# Patient Record
Sex: MALE | Race: WHITE | NOT HISPANIC OR LATINO | Employment: PART TIME | URBAN - METROPOLITAN AREA
[De-identification: names, ages, dates, MRNs, and addresses within clinical notes are randomized per-mention and may not be internally consistent; named-entity substitution may affect disease eponyms.]

---

## 2017-04-06 PROBLEM — Z86.010 HISTORY OF COLON POLYPS: Status: ACTIVE | Noted: 2017-04-06

## 2017-04-06 PROBLEM — R10.84 GENERALIZED ABDOMINAL PAIN: Status: ACTIVE | Noted: 2017-04-06

## 2017-04-06 PROBLEM — Z86.0100 HISTORY OF COLON POLYPS: Status: ACTIVE | Noted: 2017-04-06

## 2017-04-06 PROBLEM — R63.4 WEIGHT LOSS: Status: ACTIVE | Noted: 2017-04-06

## 2017-04-12 PROBLEM — N17.9 AKI (ACUTE KIDNEY INJURY): Status: ACTIVE | Noted: 2017-04-12

## 2017-04-12 PROBLEM — I48.92 ATRIAL FLUTTER: Status: ACTIVE | Noted: 2017-04-12

## 2017-05-31 PROBLEM — I48.92 ATRIAL FLUTTER WITH RAPID VENTRICULAR RESPONSE: Status: ACTIVE | Noted: 2017-05-31

## 2017-05-31 PROBLEM — I48.91 ATRIAL FIBRILLATION: Status: ACTIVE | Noted: 2017-04-12

## 2017-06-05 ENCOUNTER — PATIENT OUTREACH (OUTPATIENT)
Dept: ADMINISTRATIVE | Facility: CLINIC | Age: 72
End: 2017-06-05
Payer: MEDICARE

## 2017-06-05 ENCOUNTER — NURSE TRIAGE (OUTPATIENT)
Dept: ADMINISTRATIVE | Facility: CLINIC | Age: 72
End: 2017-06-05

## 2017-06-05 NOTE — PATIENT INSTRUCTIONS
Discharge Instructions for Atrial Fibrillation  You have been diagnosed with an abnormal heart rhythm called atrial fibrillation. With this condition, your hearts 2 upper chambers quiver rather than squeeze the blood out in a normal pattern. This leads to an irregular and sometimes rapid heartbeat. Some people will develop associated symptoms such as a flip-flopping heartbeat, chest pain, lightheadedness, or shortness of breath. Other people may have no symptoms at all. Atrial fibrillation is serious because it affects the hearts ability to fill with blood as it should. Blood clots may form. This increases the risk for stroke. Untreated atrial fibrillation can also lead to heart failure. Atrial fibrillation can be controlled. With treatment, most people with atrial fibrillation lead normal lives.  Treatment options  Recommended treatment for atrial fibrillation depends on your age, symptoms, how long you have had atrial fibrillation, and other factors. You will have a complete evaluation to find out if you have any abnormalities that caused your heart to go into atrial fibrillation. This might be blocked heart arteries or a thyroid problem. Your doctor will assess your particular case and discuss choices with you.  Treatment choices may include:  · Treating an underlying disorder that puts you at risk for atrial fibrillation. For example, correcting an abnormal thyroid or electrolyte problem, or treating a blocked heart artery.  · Restoring a normal heart rhythm with an electrical shock (cardioversion) or with an antiarrhythmic medicine (chemical cardioversion)  · Using medicine to control your heart rate in atrial fibrillation.  · Preventing the risk for blood clot and stroke using blood-thinning medicines. Your doctor will tell you what he or she recommends. Choices may include aspirin, clopidogrel, warfarin, dabigatran, rivaroxaban, apixaban, and edoxaban.  · Doing catheter ablation or a surgical maze  procedure. These use different methods to destroy certain areas of heart tissue. This interrupts the electrical signals causing atrial fibrillation. One of these procedures may be a choice when medicines do not work, or as an alternative to long-term medicine.  · Other treatment choices may be recommended for you by your doctor.  Managing risk factors for stroke and preventing heart failure are important parts of any treatment plan for atrial fibrillation.  Home care  · Take your medicines exactly as directed. Dont skip doses.  · Work with your doctor to find the right medicines and doses for you.  · Learn to take your own pulse. Keep a record of your results. Ask your doctor which pulse rates mean that you need medical attention. Slowing your pulse is often the goal of treatment. Ask your doctor if its OK for you to use an automatic machine to check your pulse at home. Sometimes these machines dont count the pulse correctly when you have atrial fibrillation.  · Limit your intake of coffee, tea, cola, and other beverages with caffeine. Talk with your doctor about whether you should eliminate caffeine.  · Avoid over-the-counter medicines that have caffeine in them.  · Let your doctor know what medicines you take, including prescription and over-the-counter medicines, as well as any supplements. They interfere with some medicines given for atrial fibrillation.  · Ask your doctor about whether you can drink alcohol. Some people need to avoid alcohol to better treat atrial fibrillation. If you are taking blood-thinner medicines, alcohol may interfere with them by increasing their effect.  · Never take stimulants such as amphetamines or cocaine. These drugs can speed up your heart rate and trigger atrial fibrillation.  Follow-up care  Follow up with your doctor, or as advised.     When should I call my healthcare provider  Call your healthcare provider right away if you have any of the  following:  · Weakness  · Dizziness  · Fainting  · Fatigue  · Shortness of breath  · Chest pain with increased activity  · A change in the usual regularity of your heartbeat, or an unusually fast heartbeat   Date Last Reviewed: 4/23/2016  © 2596-4233 [employer] [place] [address], [place] [address]. All rights reserved. This information is not intended as a substitute for professional medical care. Always follow your healthcare professional's instructions.

## 2017-06-05 NOTE — PROGRESS NOTES
C3 nurse attempted to contact patient. The following occurred:   C3 nurse attempted to contact Tacho Guerrier  for a TCC post hospital discharge follow up call. The patient is unable to conduct the call @ this time. The patient requested a callback.    The patient does not have a scheduled HOSFU appointment within 7-14 days post hospital discharge date 06/01/17. Message sent to  to assist with HOSFU appointment scheduling.

## 2017-06-05 NOTE — TELEPHONE ENCOUNTER
Reason for Disposition   [1] Follow-up call to recent contact AND [2] information only call, no triage required    Protocols used: ST INFORMATION ONLY CALL-A-    Pt returning call to call center. RN notified. Call back with questions.

## 2017-07-17 PROBLEM — I48.92 ATRIAL FLUTTER WITH RAPID VENTRICULAR RESPONSE: Status: RESOLVED | Noted: 2017-05-31 | Resolved: 2017-07-17

## 2017-07-17 PROBLEM — I48.0 PAF (PAROXYSMAL ATRIAL FIBRILLATION): Status: ACTIVE | Noted: 2017-04-12

## 2017-07-17 PROBLEM — I48.3 TYPICAL ATRIAL FLUTTER: Status: ACTIVE | Noted: 2017-04-12

## 2017-09-22 ENCOUNTER — OFFICE VISIT (OUTPATIENT)
Dept: NEUROLOGY | Facility: CLINIC | Age: 72
End: 2017-09-22
Payer: MEDICARE

## 2017-09-22 VITALS
WEIGHT: 142 LBS | DIASTOLIC BLOOD PRESSURE: 68 MMHG | RESPIRATION RATE: 18 BRPM | HEIGHT: 72 IN | BODY MASS INDEX: 19.23 KG/M2 | HEART RATE: 68 BPM | SYSTOLIC BLOOD PRESSURE: 108 MMHG

## 2017-09-22 DIAGNOSIS — G25.0 ESSENTIAL TREMOR: Primary | ICD-10-CM

## 2017-09-22 PROCEDURE — 99999 PR PBB SHADOW E&M-EST. PATIENT-LVL III: CPT | Mod: PBBFAC,,, | Performed by: PSYCHIATRY & NEUROLOGY

## 2017-09-22 PROCEDURE — 99204 OFFICE O/P NEW MOD 45 MIN: CPT | Mod: S$PBB | Performed by: PSYCHIATRY & NEUROLOGY

## 2017-09-22 PROCEDURE — 99213 OFFICE O/P EST LOW 20 MIN: CPT | Mod: PBBFAC | Performed by: PSYCHIATRY & NEUROLOGY

## 2017-09-22 PROCEDURE — 99999 PR STA SHADOW: CPT | Mod: PBBFAC,,, | Performed by: PSYCHIATRY & NEUROLOGY

## 2017-09-22 RX ORDER — RITONAVIR 100 MG/1
100 TABLET, FILM COATED ORAL DAILY
COMMUNITY
Start: 2017-08-05 | End: 2018-09-24

## 2017-09-22 NOTE — PROGRESS NOTES
HPI: He is here for management of his knownTremor: This was diagnosed as essential tremor prior. Tremor primarily involves the bilateral hands.  Onset of symptoms was in the 1990s, mild then and worsening the past year or two. He started Welburtin about 3 years for depression. He has never been hospitalized for depression. This helps his mood and he feels his mood is good and he may not need as much medication currently for mood. . His grandfather had the same tremor which involved his head and his paternal aunt has the same tremor.  Symptoms are currently of moderate severity. Tremor exacerbated by activity and is not present at rest. He notes this with drinking, eating writing.  Tremor is alleviated by resting extremity. He does not drink alcohol.He denies voice change, rigidity, difficult with posture, difficulty with swallowing and balance problems.   Gabapentin was used for numbness in the feet with HIV meds. He is still takes gabapentin 600mg nightly   He has a history of HIV which was diagnosed in in the 1990s as well.   He was unable to tolerate propranolol and it was feared that Primidone would interfere with his HAART.   ROS      I have reviewed all of this patient's past medical and surgical histories as well as family and social histories and active allergies and medications as documented in the electronic medical record.        Exam:  Gen Appearance, well developed/nourished in no apparent distress  CV: 2+ distal pulses with no edema or swelling  Neuro:  MS: Awake, alert, oriented to place, person, time, situation. Sustains attention. Recent/remote memory intact, Language is full to spontaneous speech/repetition/naming/comprehension. Fund of Knowledge is full  CN: Optic discs are flat with normal vasculature, PERRL, Extraoccular movements and visual fields are full. Normal facial sensation and strength, Hearing symmetric, Tongue and Palate are midline and strong. Shoulder Shrug symmetric and  strong.  Motor: Normal bulk, tone, no abnormal movements at rest but there is no-no tremor of the head at times and moderate tremor of outstretched hands. 5/5 strength bilateral upper/lower extremities with 1+ reflexes and no clonus  Sensory: symmetric to light touch, pain, temp, and vibration Romberg negative  Cerebellar: Finger-nose,Heal-shin, Rapid alternating movements intact  Gait: Normal stance, no ataxia    Labs: TSh this year is normal. CMP normal 7/2017      Assessment/Plan: Tacho Guerrier Jr. is a 71 y.o. male with many years of benign essential tremor  I recommend:   1. Wellbutrin is likely no longer needed at current dose and may be worsening his tremor. Reduce Wellbutrin to 150mg (1 and 1/2 pills) daily for 3 weeks, then reduce to 100mg ( 1 pill for 3 weeks). If still having tremor and mood is no worse after 3 more weeks, can reduce Wellbutrin to 50mg daily 1/2 pill   Primidone  2. Note his HIV status currently with low or undetectable viral loads: Tremor unrelated and instead related to family history.   3. Gabapentin is used for his numbness related to HAART. He has not noticed that this helps tremor. He was unable to tolerate propranolol and it was feared that Primidone would interfere with his HAART. Continue current dose gabapentin for now- if lowering dose of Wellbutrin is not tolerated, will consider another medication trial at the next visit.   RTC 12 weeks.

## 2017-09-22 NOTE — PATIENT INSTRUCTIONS
Reduce Wellbutrin to 150mg (1 and 1/2 pills) daily for 3 weeks, then reduce to 100mg ( 1 pill for 3 weeks). If still having tremor and mood is no worse after 3 more weeks, can reduce Wellbutrin to 50mg daily 1/2 pill

## 2017-12-28 ENCOUNTER — OFFICE VISIT (OUTPATIENT)
Dept: NEUROLOGY | Facility: CLINIC | Age: 72
End: 2017-12-28
Payer: MEDICARE

## 2017-12-28 VITALS
HEART RATE: 68 BPM | WEIGHT: 141.75 LBS | RESPIRATION RATE: 14 BRPM | HEIGHT: 72 IN | SYSTOLIC BLOOD PRESSURE: 114 MMHG | BODY MASS INDEX: 19.2 KG/M2 | DIASTOLIC BLOOD PRESSURE: 60 MMHG

## 2017-12-28 DIAGNOSIS — G25.0 ESSENTIAL TREMOR: Primary | Chronic | ICD-10-CM

## 2017-12-28 DIAGNOSIS — B20 HIV (HUMAN IMMUNODEFICIENCY VIRUS INFECTION): Chronic | ICD-10-CM

## 2017-12-28 PROCEDURE — 99215 OFFICE O/P EST HI 40 MIN: CPT | Mod: PBBFAC | Performed by: NURSE PRACTITIONER

## 2017-12-28 PROCEDURE — 99214 OFFICE O/P EST MOD 30 MIN: CPT | Mod: S$PBB | Performed by: NURSE PRACTITIONER

## 2017-12-28 PROCEDURE — 99999 PR PBB SHADOW E&M-EST. PATIENT-LVL V: CPT | Mod: PBBFAC,,, | Performed by: NURSE PRACTITIONER

## 2017-12-28 PROCEDURE — 99999 PR STA SHADOW: CPT | Mod: PBBFAC,,, | Performed by: NURSE PRACTITIONER

## 2017-12-28 RX ORDER — PROPRANOLOL HYDROCHLORIDE 10 MG/1
10 TABLET ORAL 2 TIMES DAILY
Qty: 60 TABLET | Refills: 11 | Status: SHIPPED | OUTPATIENT
Start: 2017-12-28 | End: 2018-01-25

## 2017-12-28 NOTE — PROGRESS NOTES
HPI: Tacho Guerrier Jr. is a 72 y.o. male with many years of benign essential tremor. He has a history of HIV, diagnosed in the 1990's. He also has depression.     He presents today for a follow up visit. He was advised to wean his Wellbutrin at his last visit to see if this was contributing to his tremor. He experience much worsening of his depression with the first dose wean, and further worsening with the next dose wean, and had to return to taking his original dose to control his depression. He reports that he had essential tremor long before he started Wellbutrin four years ago, and that there was no worsening of his tremor upon initiation of Wellbutrin.     ROS      I have reviewed all of this patient's past medical and surgical histories as well as family and social histories and active allergies and medications as documented in the electronic medical record.    Exam:  Gen Appearance, well developed/nourished in no apparent distress  CV: 2+ distal pulses with no edema or swelling  Neuro:  MS: Awake, alert, oriented to place, person, time, situation. Sustains attention. Recent/remote memory intact, Language is full to spontaneous speech/repetition/naming/comprehension. Fund of Knowledge is full  CN: Optic discs are flat with normal vasculature, PERRL, Extraoccular movements and visual fields are full. Normal facial sensation and strength, Hearing symmetric, Tongue and Palate are midline and strong. Shoulder Shrug symmetric and strong.  Motor: Normal bulk, tone, no abnormal movements at rest but there is no-no tremor of the head at times and moderate tremor of outstretched hands. 5/5 strength bilateral upper/lower extremities with 1+ reflexes and no clonus  Sensory: symmetric to light touch, pain, temp, and vibration Romberg negative  Cerebellar: Finger-nose,Heal-shin, Rapid alternating movements intact  Gait: Normal stance, no ataxia    Labs: TSh this year is normal. CMP normal 7/2017    Assessment/Plan:Tacho COBURN  Chey Morales is a 72 y.o. male with many years of benign essential tremor.     I recommend:   1. Trial of low dose Propranolol 5 mg bid, then increase as tolerated to 10 mg bid slowly. He did attempt Propranolol in the past, but was unable to tolerate. Primidone has been avoided, given his HAART use. It is likely that agents, such as Topamax would also have an interaction with his HAART.   2. He was unable to wean Wellbutrin, due to worsening of his depression; however, historically, his tremor did not worsen upon initiation of Wellbutrin.   3. Note his HIV status currently with low or undetectable viral loads: Tremor unrelated and instead related to family history.   4. Gabapentin is used for his numbness related to HAART. He has not noticed that this helps tremor. He was unable to tolerate propranolol and it was feared that Primidone would interfere with his HAART. Continue current dose Gabapentin for now.     RTC 4 weeks.

## 2017-12-28 NOTE — PATIENT INSTRUCTIONS
To start Propranolol, take:  1/2 in the morning, 1/2 at night for 1 week, then   Increase to 1 tablet in the morning for 1 week, then   Increase to 1 tablet twice a day afterwards if tolerated.     If at any point, you cannot tolerate the dose increase, reduce back to prior dose.

## 2018-01-12 ENCOUNTER — NURSE TRIAGE (OUTPATIENT)
Dept: ADMINISTRATIVE | Facility: CLINIC | Age: 73
End: 2018-01-12

## 2018-01-12 NOTE — TELEPHONE ENCOUNTER
Reason for Disposition   [1] Symptoms of pill stuck in throat or esophagus (e.g., pain in throat or chest, FB sensation) AND [2] no relief after using CARE ADVICE    Protocols used: ST SWALLOWED FOREIGN BODY-A-AH    Pt states he feels like the pill or pills is still stuck in his throat despite care advice form previous call. Attempted to schedule apt, schedule blocked. Please call pt to schedule/advise.

## 2018-01-12 NOTE — TELEPHONE ENCOUNTER
Reason for Disposition   Pill stuck in esophagus  (all triage questions negative)    Protocols used: ST SWALLOWED FOREIGN BODY-A-AH    Pt states a pill I stuck in his throat. States it is starting to feel better, there was a burning pain that is now getting better. Care advice given.

## 2018-01-25 ENCOUNTER — OFFICE VISIT (OUTPATIENT)
Dept: NEUROLOGY | Facility: CLINIC | Age: 73
End: 2018-01-25
Payer: MEDICARE

## 2018-01-25 ENCOUNTER — TELEPHONE (OUTPATIENT)
Dept: NEUROLOGY | Facility: CLINIC | Age: 73
End: 2018-01-25

## 2018-01-25 VITALS
DIASTOLIC BLOOD PRESSURE: 66 MMHG | BODY MASS INDEX: 19.87 KG/M2 | SYSTOLIC BLOOD PRESSURE: 110 MMHG | WEIGHT: 146.69 LBS | HEIGHT: 72 IN | HEART RATE: 64 BPM | RESPIRATION RATE: 16 BRPM

## 2018-01-25 DIAGNOSIS — B20 HIV (HUMAN IMMUNODEFICIENCY VIRUS INFECTION): Chronic | ICD-10-CM

## 2018-01-25 DIAGNOSIS — G25.0 ESSENTIAL TREMOR: Primary | Chronic | ICD-10-CM

## 2018-01-25 PROCEDURE — 99999 PR PBB SHADOW E&M-EST. PATIENT-LVL V: CPT | Mod: PBBFAC,,, | Performed by: NURSE PRACTITIONER

## 2018-01-25 PROCEDURE — 99215 OFFICE O/P EST HI 40 MIN: CPT | Mod: PBBFAC | Performed by: NURSE PRACTITIONER

## 2018-01-25 PROCEDURE — 99999 PR STA SHADOW: CPT | Mod: PBBFAC,,, | Performed by: NURSE PRACTITIONER

## 2018-01-25 PROCEDURE — 99214 OFFICE O/P EST MOD 30 MIN: CPT | Mod: S$PBB | Performed by: NURSE PRACTITIONER

## 2018-01-25 NOTE — PROGRESS NOTES
HPI: Tacho Guerrier Jr. is a 72 y.o. male with many years of benign essential tremor. He has a history of HIV, diagnosed in the 1990's. He also has depression.     He presents today for a follow up visit. He was placed on Propranolol at his last visit for essential tremor, which was overall ineffective for his tremor. His tremor continues to be quite bothersome to him, and interferes with him writing.     Review of Systems   Constitutional: Negative for malaise/fatigue.   Eyes: Negative for double vision.   Cardiovascular: Negative for chest pain.   Skin: Negative for rash.   Neurological: Positive for tremors and sensory change. Negative for tingling, focal weakness and weakness.   Psychiatric/Behavioral: Positive for depression. Negative for memory loss.         I have reviewed all of this patient's past medical and surgical histories as well as family and social histories and active allergies and medications as documented in the electronic medical record.    Exam:  Gen Appearance, well developed/nourished in no apparent distress  CV: 2+ distal pulses with no edema or swelling  Neuro:  MS: Awake, alert, oriented to place, person, time, situation. Sustains attention. Recent/remote memory intact, Language is full to spontaneous speech/repetition/naming/comprehension. Fund of Knowledge is full  CN: Optic discs are flat with normal vasculature, PERRL, Extraoccular movements and visual fields are full. Normal facial sensation and strength, Hearing symmetric, Tongue and Palate are midline and strong. Shoulder Shrug symmetric and strong.  Motor: Normal bulk, tone, no abnormal movements at rest but there is no-no tremor of the head at times and moderate tremor of outstretched hands. 5/5 strength bilateral upper/lower extremities with 1+ reflexes and no clonus  Sensory: symmetric to light touch, pain, temp, and vibration Romberg negative  Cerebellar: Finger-nose,Heal-shin, Rapid alternating movements intact  Gait: Normal  stance, no ataxia    Labs: TSH this year is normal. CMP normal 7/2017    Assessment/Plan:Tacho Guerrier Jr. is a 72 y.o. male with many years of benign essential tremor.     I recommend:   1. Wean Propranolol, as this was ineffective for his ET. Gabapentin, which he takes for neuropathic complaints, experienced after starting HAART, has had no effect on his tremors, which also failed to respond to weaning Wellbutrin, which only resulted in worsening of his depression. His tremor predated his Wellbutrin use.   2. Primidone has been avoided, given his HAART use. It is likely that other agents, such as Topamax would also have an interaction with his HAART.   3. Refer to movement disorders, Dr. Martinez, for consideration of options for his ET, given his HAART therapy and limitations. He may be a candidate for DBS, given his limited medication options.   3. Note his HIV status currently with low or undetectable viral loads: Tremor unrelated and instead related to family history.      FU 6 months.

## 2018-01-25 NOTE — TELEPHONE ENCOUNTER
Spoke with LAW Clayton, in Dr. Dejesus's office to discuss a NP appt with Dr. Martinez for essential tremor. First available to be booked on March 21st. Matilde will update the pt with date and time. If needing to r/s the appt, please contact our office [phone].

## 2018-01-25 NOTE — TELEPHONE ENCOUNTER
----- Message from Loida Frankie sent at 1/25/2018 10:07 AM CST -----  Contact: Mere Perez's Office NP- [phone]  The office called to get the pt scheduled with either Dr. Martinez or Dr. Saha at Mere's request- doesn't want the pt with a NP- being referred over for Essential tremor [G25.0]- please contact her office at [phone]

## 2018-01-25 NOTE — PATIENT INSTRUCTIONS
To wean Propranolol, take 1 at night for 1 week, then stop, but you may resume, if you believe it was helpful after weaning.

## 2018-02-07 ENCOUNTER — CLINICAL SUPPORT (OUTPATIENT)
Dept: OCCUPATIONAL MEDICINE | Facility: CLINIC | Age: 73
End: 2018-02-07

## 2018-02-07 DIAGNOSIS — Z02.83 ENCOUNTER FOR DRUG SCREENING: ICD-10-CM

## 2018-02-07 PROCEDURE — 80305 DRUG TEST PRSMV DIR OPT OBS: CPT | Mod: S$GLB,,, | Performed by: NURSE PRACTITIONER

## 2018-02-10 ENCOUNTER — TELEPHONE (OUTPATIENT)
Dept: URGENT CARE | Facility: CLINIC | Age: 73
End: 2018-02-10

## 2018-02-16 ENCOUNTER — OFFICE VISIT (OUTPATIENT)
Dept: OCCUPATIONAL MEDICINE | Facility: CLINIC | Age: 73
End: 2018-02-16
Payer: COMMERCIAL

## 2018-02-16 VITALS
HEIGHT: 72 IN | OXYGEN SATURATION: 98 % | RESPIRATION RATE: 16 BRPM | SYSTOLIC BLOOD PRESSURE: 115 MMHG | BODY MASS INDEX: 19.37 KG/M2 | WEIGHT: 143 LBS | HEART RATE: 66 BPM | TEMPERATURE: 97 F | DIASTOLIC BLOOD PRESSURE: 71 MMHG

## 2018-02-16 DIAGNOSIS — Z48.02 ENCOUNTER FOR REMOVAL OF SUTURES: Primary | ICD-10-CM

## 2018-02-16 PROCEDURE — 3008F BODY MASS INDEX DOCD: CPT | Mod: S$GLB,,, | Performed by: INTERNAL MEDICINE

## 2018-02-16 PROCEDURE — 1159F MED LIST DOCD IN RCRD: CPT | Mod: S$GLB,,, | Performed by: INTERNAL MEDICINE

## 2018-02-16 PROCEDURE — 99024 POSTOP FOLLOW-UP VISIT: CPT | Mod: S$GLB,,, | Performed by: INTERNAL MEDICINE

## 2018-02-16 PROCEDURE — 1126F AMNT PAIN NOTED NONE PRSNT: CPT | Mod: S$GLB,,, | Performed by: INTERNAL MEDICINE

## 2018-02-16 RX ORDER — ONDANSETRON 8 MG/1
8 TABLET, ORALLY DISINTEGRATING ORAL EVERY 6 HOURS PRN
COMMUNITY
End: 2018-03-21

## 2018-02-16 NOTE — PROGRESS NOTES
Pt here post sutures at [facility], day 9. Here originally for drug screen. Reports overall doing well.     ROS: no discharge from healing lac, no erythema, pain improving.     PE: upside down V noted with sutures intact    Suture removal:   Cleaned left hand with betadine. Removed 8 sutures with no issues. Dry and intact. Instructions given for cleaning and precautions for potential infection.     Pt discharged with no restrictions.

## 2018-02-24 ENCOUNTER — OFFICE VISIT (OUTPATIENT)
Dept: OCCUPATIONAL MEDICINE | Facility: CLINIC | Age: 73
End: 2018-02-24
Payer: COMMERCIAL

## 2018-02-24 VITALS
SYSTOLIC BLOOD PRESSURE: 120 MMHG | HEART RATE: 66 BPM | TEMPERATURE: 98 F | OXYGEN SATURATION: 98 % | BODY MASS INDEX: 19.37 KG/M2 | RESPIRATION RATE: 16 BRPM | DIASTOLIC BLOOD PRESSURE: 70 MMHG | HEIGHT: 72 IN | WEIGHT: 143 LBS

## 2018-02-24 DIAGNOSIS — S61.419S: Primary | ICD-10-CM

## 2018-02-24 PROCEDURE — 3008F BODY MASS INDEX DOCD: CPT | Mod: S$GLB,,,

## 2018-02-24 PROCEDURE — 99213 OFFICE O/P EST LOW 20 MIN: CPT | Mod: S$GLB,,,

## 2018-02-24 PROCEDURE — 1159F MED LIST DOCD IN RCRD: CPT | Mod: S$GLB,,,

## 2018-02-24 PROCEDURE — 1125F AMNT PAIN NOTED PAIN PRSNT: CPT | Mod: S$GLB,,,

## 2018-02-24 RX ORDER — MUPIROCIN 20 MG/G
OINTMENT TOPICAL
Qty: 22 G | Refills: 1 | Status: SHIPPED | OUTPATIENT
Start: 2018-02-24 | End: 2018-04-26

## 2018-02-24 NOTE — PROGRESS NOTES
Subjective:       Patient ID: Tacho Guerrier Jr. is a 72 y.o. male.    Vitals:  height is 6' (1.829 m) and weight is 64.9 kg (143 lb). His tympanic temperature is 98.1 °F (36.7 °C). His blood pressure is 120/70 and his pulse is 66. His respiration is 16 and oxygen saturation is 98%.     Chief Complaint: Hand Injury (s/p laceration, still hurting)    Patient lacerated hand at work on 2/7/18.  [facility] was [facility]  Patient came to our facility to have sutures removed a week ago but still having pain, redness and swelling to area.      Hand Injury    His dominant hand is their left hand. The incident occurred more than 1 week ago (Date of injury 02/07/2018). The incident occurred at work. The injury mechanism was a direct blow. The pain is present in the left hand. The quality of the pain is described as burning. The pain does not radiate. The pain is at a severity of 5/10. The pain is moderate. The pain has been worsening since the incident. The symptoms are aggravated by palpation. He has tried nothing for the symptoms. The treatment provided no relief.     Review of Systems   Constitution: Negative for chills and fever.   HENT: Negative for sore throat.    Respiratory: Negative for shortness of breath.    Skin: Positive for color change. Negative for itching and rash.   Musculoskeletal: Negative for joint pain.        Left hand angular laceration wound edges approximated; not edematous; not erythematous; not painful to touch, flexion or extension; not draining;   Psychiatric/Behavioral: Negative for suicidal ideas and thoughts of violence.       Objective:      Physical Exam    Assessment:       1. Laceration of hand, foreign body presence unspecified, unspecified laterality, sequela        Plan:     apply Bactroban ointment daily; keep wound covered for 48 hours;     Laceration of hand, foreign body presence unspecified, unspecified laterality, sequela

## 2018-02-24 NOTE — PATIENT INSTRUCTIONS
Hand Laceration: All Closures  A laceration is a cut through the skin. You have a cut on the hand. Deep cuts usually require stitches (sutures) or staples. Minor cuts may be closed with surgical tape or skin adhesive.   X-rays may be done if something may have entered the skin through the cut. Your may also be given a tetanus shot. This may be given if you are not updated on this vaccination and the object that cut you may carry tetanus.    Home care  · Your healthcare provider may prescribe an antibiotic. This is to help prevent infection. Follow all instructions for taking this medicine. Take the medicine every day until it is gone or you are told to stop. You should not have any left over.  · The healthcare provider may prescribe medicines for pain. Follow instructions for taking them.  · Follow the healthcare providers instructions on how to care for the cut.  · Keep the wound clean and dry. Do not get the wound wet until you are told it is okay to do so. If the bandage gets wet, remove it. Gently pat the wound dry with a clean cloth. Then put on a clean, dry bandage..  · To help prevent infection, wash your hands with soap and water before and after caring for the wound.   · Caring for sutures or staples: Once you no longer need to keep them dry, clean the wound daily. First, remove the bandage. Then wash the area gently with soap and warm water, or as directed by the health care provider. Use a wet cotton swab to loosen and remove any blood or crust that forms. After cleaning, apply a thin layer of antibiotic ointment if advised. Then put on a new bandage unless you are told not to.  · Caring for skin glue: Dont put apply liquid, ointment, or cream on the wound while the glue is in place. Avoid activities that cause heavy sweating. Protect the wound from sunlight. Do not scratch, rub, or pick at the adhesive film. Do not place tape directly over the film. The glue should peel off within 5 to 10  days.   · Caring for surgical tape: Keep the area dry. If it gets wet, blot it dry with a clean towel. Surgical tape usually falls off within 7 to 10 days. If it has not fallen off after 10 days, you can take it off yourself. Put mineral oil or petroleum jelly on a cotton ball and gently rub the tape until it is removed.  · Once you can get the wound wet, you may shower as usual but do not soak the wound in water (no tub baths or swimming)  · Even with proper treatment, a wound infection may sometimes occur. Check the wound daily for signs of infection listed below.  Follow-up care  Follow up with your healthcare provider as advised. If you have stitches or staples, be sure to return as directed to have them removed.  When to seek medical advice  Call your healthcare provider right away if any of these occur:  · Wound bleeding not controlled by direct pressure  · Signs of infection, including increasing pain in the wound, increasing wound redness or swelling, or pus or bad odor coming from the wound  · Fever of 100.4°F (38.ºC) or as directed by your health care provider  · Stitches or staples come apart or fall out or surgical tape falls off before 7 days  · Wound edges re-open  · Wound changes colors  · Numbness or weakness in the affected hand   · Decreased movement of the hand  Date Last Reviewed: 6/10/2015  © 9641-6483 [employer] [address] [place] [address], [place] [address]. All rights reserved. This information is not intended as a substitute for professional medical care. Always follow your healthcare professional's instructions.

## 2018-02-27 ENCOUNTER — TELEPHONE (OUTPATIENT)
Dept: URGENT CARE | Facility: CLINIC | Age: 73
End: 2018-02-27

## 2018-03-05 ENCOUNTER — NURSE TRIAGE (OUTPATIENT)
Dept: ADMINISTRATIVE | Facility: CLINIC | Age: 73
End: 2018-03-05

## 2018-03-05 NOTE — TELEPHONE ENCOUNTER
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  Last night and this morning, he is experiencing blurry eyes, splitting headache, nasal congestion, intermittent mild difficulty breathing, and had chills last night.  ( I can appreciate no respiratory distress while speaking with the patient)  Pt states he did not get the chemical in his eyes, does not have seasonal allergies and has not been exposed to anyone with cold/flu.    Protocols used: ST POISONING-A-OH

## 2018-03-21 ENCOUNTER — OFFICE VISIT (OUTPATIENT)
Dept: NEUROLOGY | Facility: CLINIC | Age: 73
End: 2018-03-21
Payer: MEDICARE

## 2018-03-21 VITALS
HEART RATE: 54 BPM | WEIGHT: 146.19 LBS | DIASTOLIC BLOOD PRESSURE: 76 MMHG | HEIGHT: 72 IN | BODY MASS INDEX: 19.8 KG/M2 | SYSTOLIC BLOOD PRESSURE: 133 MMHG

## 2018-03-21 DIAGNOSIS — G20.C PARKINSONISM, UNSPECIFIED PARKINSONISM TYPE: ICD-10-CM

## 2018-03-21 DIAGNOSIS — G25.0 ESSENTIAL TREMOR: Primary | ICD-10-CM

## 2018-03-21 PROCEDURE — 99214 OFFICE O/P EST MOD 30 MIN: CPT | Mod: S$PBB,,, | Performed by: PSYCHIATRY & NEUROLOGY

## 2018-03-21 PROCEDURE — 99999 PR PBB SHADOW E&M-EST. PATIENT-LVL III: CPT | Mod: PBBFAC,,, | Performed by: PSYCHIATRY & NEUROLOGY

## 2018-03-21 PROCEDURE — 99213 OFFICE O/P EST LOW 20 MIN: CPT | Mod: PBBFAC | Performed by: PSYCHIATRY & NEUROLOGY

## 2018-03-21 RX ORDER — PROPRANOLOL HYDROCHLORIDE 10 MG/1
20 TABLET ORAL 2 TIMES DAILY
Qty: 120 TABLET | Refills: 11 | Status: SHIPPED | OUTPATIENT
Start: 2018-03-21 | End: 2018-06-22

## 2018-03-21 RX ORDER — DIPHENHYDRAMINE HCL 25 MG
25 CAPSULE ORAL NIGHTLY PRN
COMMUNITY
End: 2018-04-26

## 2018-03-21 RX ORDER — PROPRANOLOL HYDROCHLORIDE 10 MG/1
10 TABLET ORAL 2 TIMES DAILY
COMMUNITY
Start: 2018-03-15 | End: 2018-03-21 | Stop reason: SDUPTHER

## 2018-03-21 RX ORDER — FEXOFENADINE HCL AND PSEUDOEPHEDRINE HCI 60; 120 MG/1; MG/1
1 TABLET, EXTENDED RELEASE ORAL DAILY
COMMUNITY
End: 2018-04-26

## 2018-03-21 NOTE — PROGRESS NOTES
Name: Tacho Guerrier Jr.  MRN: [id number]   CSN: [id number]      Date: 03/21/2018    Referring physician:  Mere Dejesus NP  [address] [place] [address]    Chief Complaint / Interval History: Essential tremors    History of Present Illness (HPI):  73 yo with tremors for about 10 years , barely there for years, but gradually worse.  Has been worsening for 4 years.  Doesn;t have any tremor in his voice that he notices (I do).  Does also describe a chin tremor at times.  No leg tremor.      Hearing is a bit affected, pretty good he says.    Nonmotor/Premotor ROS:  Hyposmia (HENT)?No  RBD/sleep issues (Constitutional)?No  Depression/anxiety (Psychiatric)?No  Fatigue (Constitutional)?No  Constipation (GI)?Yes - takes stool softeners  Urinary issues ()?No - some urge  Sexual dysfunction ()?No  Orthostasis (Cardiovascular)?No  Leg swelling (Cardiovascular)? No  Falls (Musculoskeletal)?No  Cognitive impairment (Neurologic)?No  Psychoses (Psychiatric)?No  Pain/Paresthesia (Neurologic)?Yes  Visual changes (Eyes)?No  Moles / skin changes (Skin)?No  Stridor / SOB (Pulm)?No  Bruising (Heme)?No    Past Medical History: The patient  has a past medical history of A-fib; Atrial flutter; Heart murmur; HIV (human immunodeficiency virus infection); Other and unspecified hyperlipidemia; SVT (supraventricular tachycardia); and Tachycardia.    Social History: The patient  reports that he has never smoked. He has never used smokeless tobacco. He reports that he does not drink alcohol or use drugs.    Family History: Their family history includes Breast cancer in his mother; Cancer in his father; Cirrhosis in his mother; Diabetes in his mother; Heart disease in his mother; Hypertension in his mother; Hypotension in his father; Lung cancer in his father.    Allergies: Sulfa (sulfonamide antibiotics) and Latex, natural rubber     Meds:   Current Outpatient Prescriptions on File Prior to Visit   Medication Sig Dispense Refill     abacavir-lamivudine (EPZICOM) 600-300 mg per tablet Take 1 tablet by mouth once daily. 90 tablet 3    ACETAMINOPHEN (TYLENOL ORAL) Take by mouth.      apixaban (ELIQUIS) 2.5 mg Tab Take 1 tablet (2.5 mg total) by mouth 2 (two) times daily. 60 tablet 5    ascorbic acid, vitamin C, (VITAMIN C) 1000 MG tablet Take 1,000 mg by mouth once daily.      aspirin 81 mg TbEF Take 1 tablet by mouth once daily.      benazepril (LOTENSIN) 10 MG tablet Take 1 tablet (10 mg total) by mouth every evening. 90 tablet 3    buPROPion (WELLBUTRIN) 100 MG tablet Take 1 tablet (100 mg total) by mouth once daily. Take 2 tablets in the am (Patient taking differently: Take 200 mg by mouth once daily. ) 90 tablet 5    cetirizine (ZYRTEC) 10 MG tablet Take 1 tablet (10 mg total) by mouth once daily. 30 tablet 0    cholecalciferol, vitamin D3, (VITAMIN D3) 2,000 unit Tab Take by mouth once daily.      darunavir (PREZISTA) 800 mg Tab Take 1 tablet (800 mg total) by mouth daily with breakfast. 30 tablet 5    diltiaZEM (CARDIZEM SR) 60 MG Cp12 Take 1 tablet twice daily 60 capsule 6    flunisolide 25 mcg, 0.025%, (NASALIDE) 25 mcg (0.025 %) Spry 2 sprays by Nasal route 2 (two) times daily. 25 mL 5    FOLIC ACID/MULTIVITS-MIN (MEN'S DAILY FORMULA ORAL) Take 1 tablet by mouth once daily.      gabapentin (NEURONTIN) 300 MG capsule Take 1 capsule (300 mg total) by mouth 3 (three) times daily. May take 2 capsules at night (Patient taking differently: Take 300 mg by mouth 2 (two) times daily. May take 2 capsules at night) 120 capsule 5    mupirocin (BACTROBAN) 2 % ointment Apply to affected area 3 times daily 22 g 1    NORVIR 100 mg Tab tablet Take 100 mg by mouth once daily.       omega-3 acid ethyl esters (LOVAZA) 1 gram capsule Take 2 capsules (2 g total) by mouth 2 (two) times daily. 360 capsule 3    pantoprazole (PROTONIX) 40 MG tablet Take 1 tablet (40 mg total) by mouth once daily. 90 tablet 3    rosuvastatin (CRESTOR) 40 MG Tab  Take 1 tablet (40 mg total) by mouth every evening. 90 tablet 3    simethicone (PHAZYME) 250 mg Cap Take by mouth.      zolpidem (AMBIEN) 10 mg Tab TAKE 1 TABLET (10 MG TOTAL) BY MOUTH NIGHTLY AS NEEDED. 30 tablet 3    [DISCONTINUED] ondansetron (ZOFRAN-ODT) 8 MG TbDL Take 8 mg by mouth every 6 (six) hours as needed.       No current facility-administered medications on file prior to visit.        Exam:  /76 (BP Location: Left arm, Patient Position: Sitting, BP Method: Medium (Automatic))   Pulse (!) 54   Ht 6' (1.829 m)   Wt 66.3 kg (146 lb 2.6 oz)   BMI 19.82 kg/m²     Constitutional  Well-developed, well-nourished, appears stated age   Ophthalmoscopic  No papilledema with no hemorrhages or exudates bilaterally   Cardiovascular  Radial pulses 2+ and symmetric, no LE edema bilaterally   Neurological    * Mental status  MOCA =      - Orientation  Oriented to person, place, time, and situation     - Memory   Intact recent and remote     - Attention/concentration  Attentive, vigilant during exam     - Language  Naming & repetition intact, +2-step commands     - Fund of knowledge  Aware of current events     - Executive  Well-organized thoughts     - Other     * Cranial nerves       - CN II  PERRL, visual fields full to confrontation     - CN III, IV, VI  Extraocular movements full, normal pursuits and saccades     - CN V  Sensation V1 - V3 intact     - CN VII  Face strong and symmetric bilaterally     - CN VIII  Hearing intact bilaterally     - CN IX, X  Palate raises midline and symmetric     - CN XI  SCM and trapezius 5/5 bilaterally     - CN XII  Tongue midline   * Motor  Muscle bulk normal, strength 5/5 throughout   * Sensory   Intact to temperature and vibration throughout   * Coordination  No dysmetria with finger-to-nose or heel-to-shin   * Gait  See below.   * Deep tendon reflexes  2+ and symmetric throughout   Babinski downgoing bilaterally   * Specialized movement exam  + hypophonic speech.     No facial masking.   No cogwheel rigidity.     No bradykinesia.   R>L tremor with rest, posture, kinesis.    No other dystonia, chorea, athetosis, myoclonus, or tics.   No motor impersistence.   Normal-based gait.   No shortened stride length.   No abnormal arm swing.     No postural instability.      Laboratory/Radiological:  - Results:  Admission on 03/06/2018, Discharged on 03/06/2018   Component Date Value Ref Range Status    WBC 03/06/2018 4.06  3.90 - 12.70 K/uL Final    RBC 03/06/2018 3.93* 4.60 - 6.20 M/uL Final    Hemoglobin 03/06/2018 12.0* 14.0 - 18.0 g/dL Final    Hematocrit 03/06/2018 35.9* 40.0 - 54.0 % Final    MCV 03/06/2018 91  82 - 98 fL Final    MCH 03/06/2018 30.5  27.0 - 31.0 pg Final    MCHC 03/06/2018 33.4  32.0 - 36.0 g/dL Final    RDW 03/06/2018 12.6  11.5 - 14.5 % Final    Platelets 03/06/2018 138* 150 - 350 K/uL Final    MPV 03/06/2018 10.3  9.2 - 12.9 fL Final    Gran # (ANC) 03/06/2018 2.3  1.8 - 7.7 K/uL Final    Lymph # 03/06/2018 0.7* 1.0 - 4.8 K/uL Final    Mono # 03/06/2018 0.8  0.3 - 1.0 K/uL Final    Eos # 03/06/2018 0.3  0.0 - 0.5 K/uL Final    Baso # 03/06/2018 0.02  0.00 - 0.20 K/uL Final    nRBC 03/06/2018 0  0 /100 WBC Final    Gran% 03/06/2018 56.6  38.0 - 73.0 % Final    Lymph% 03/06/2018 17.2* 18.0 - 48.0 % Final    Mono% 03/06/2018 19.5* 4.0 - 15.0 % Final    Eosinophil% 03/06/2018 6.2  0.0 - 8.0 % Final    Basophil% 03/06/2018 0.5  0.0 - 1.9 % Final    Differential Method 03/06/2018 Automated   Final    Sodium 03/06/2018 139  136 - 145 mmol/L Final    Potassium 03/06/2018 3.3* 3.5 - 5.1 mmol/L Final    Chloride 03/06/2018 101  95 - 110 mmol/L Final    CO2 03/06/2018 29  23 - 29 mmol/L Final    Glucose 03/06/2018 113* 70 - 110 mg/dL Final    BUN, Bld 03/06/2018 10  8 - 23 mg/dL Final    Creatinine 03/06/2018 0.8  0.5 - 1.4 mg/dL Final    Calcium 03/06/2018 9.1  8.7 - 10.5 mg/dL Final    Total Protein 03/06/2018 6.3  6.0 - 8.4 g/dL Final     Albumin 03/06/2018 3.6  3.5 - 5.2 g/dL Final    Total Bilirubin 03/06/2018 0.4  0.1 - 1.0 mg/dL Final    Alkaline Phosphatase 03/06/2018 73  55 - 135 U/L Final    AST 03/06/2018 23  10 - 40 U/L Final    ALT 03/06/2018 23  10 - 44 U/L Final    Anion Gap 03/06/2018 9  8 - 16 mmol/L Final    eGFR if African American 03/06/2018 >60.0  >60 mL/min/1.73 m^2 Final    eGFR if non African American 03/06/2018 >60.0  >60 mL/min/1.73 m^2 Final       - Independent review of images:        Diagnoses:           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  No premotor features.    Medical Decision Making:  - primidone and tpm might induce hsi HAART and render then less effective  - tolerating low dose inderal right now - did NOT tolerate at 80 mg BID dosing several years ago (chest pain)  - would now cautiously go up on inderal to 20 mg BID        Tim Martinez MD, MPH  Division of Movement and Memory Disorders  [facility]  [phone]

## 2018-03-21 NOTE — PATIENT INSTRUCTIONS
1) Increase the propranolol to 2 tablets in the AM and 2 tablets in the PM only    2) No other changes to date        Tim Martinez MD, MPH  Division of Movement and Memory Disorders  [facility]

## 2018-03-21 NOTE — LETTER
March 21, 2018      Mere Dejesus, NP  [address] [facility] [address]           [facility]  [address]  [place] [address]  Phone: [phone]  Fax: [phone]          Patient: Tacho Guerrier Jr.   MR Number: [id number]   YOB: 1945   Date of Visit: 3/21/2018       Dear Mere Dejesus:    Thank you for referring Tacho Guerrier to me for evaluation. Attached you will find relevant portions of my assessment and plan of care.    If you have questions, please do not hesitate to call me. I look forward to following Tacho Guerrier along with you.    Sincerely,    Tim Martinez MD    Enclosure  CC:  No Recipients    If you would like to receive this communication electronically, please contact [email or web address] or [phone] to request more information on [employer] access.    For providers and/or their staff who would like to refer a patient to [facility], please contact us through our one-stop-shop provider referral line, [facility], at [phone].    If you feel you have received this communication in error or would no longer like to receive these types of communications, please e-mail [email or web address]

## 2018-06-22 ENCOUNTER — OFFICE VISIT (OUTPATIENT)
Dept: NEUROLOGY | Facility: CLINIC | Age: 73
End: 2018-06-22
Payer: MEDICARE

## 2018-06-22 VITALS
HEART RATE: 56 BPM | HEIGHT: 72 IN | BODY MASS INDEX: 20.37 KG/M2 | DIASTOLIC BLOOD PRESSURE: 66 MMHG | WEIGHT: 150.38 LBS | SYSTOLIC BLOOD PRESSURE: 109 MMHG

## 2018-06-22 DIAGNOSIS — G25.0 ESSENTIAL TREMOR: Primary | ICD-10-CM

## 2018-06-22 PROCEDURE — 99999 PR PBB SHADOW E&M-EST. PATIENT-LVL III: CPT | Mod: PBBFAC,,, | Performed by: PSYCHIATRY & NEUROLOGY

## 2018-06-22 PROCEDURE — 99213 OFFICE O/P EST LOW 20 MIN: CPT | Mod: S$PBB,,, | Performed by: PSYCHIATRY & NEUROLOGY

## 2018-06-22 PROCEDURE — 99213 OFFICE O/P EST LOW 20 MIN: CPT | Mod: PBBFAC | Performed by: PSYCHIATRY & NEUROLOGY

## 2018-06-22 RX ORDER — PROPRANOLOL HYDROCHLORIDE 60 MG/1
60 CAPSULE, EXTENDED RELEASE ORAL DAILY
Qty: 30 CAPSULE | Refills: 11 | Status: SHIPPED | OUTPATIENT
Start: 2018-06-22 | End: 2019-02-27 | Stop reason: SDUPTHER

## 2018-06-22 NOTE — PROGRESS NOTES
Name: Tacho Guerrier Jr.  MRN: [id number]   CSN: [id number]      Date: 06/22/2018      Chief Complaint / Interval History: No chief complaint on file.  - tremor is about the same t improved since last time  - voice is the same (he doesn't hear the tremor), but also a little lower, no swallow issues  - balance is stable, no falls  - no chin tremor now  - generally happy and stable  -      History of Present Illness (HPI):  73 yo with tremors for about 10 years , barely there for years, but gradually worse.  Has been worsening for 4 years.  Doesn;t have any tremor in his voice that he notices (I do).  Does also describe a chin tremor at times.  No leg tremor.      Hearing is a bit affected, pretty good he says.    Nonmotor/Premotor ROS:  Hyposmia (HENT)?No  RBD/sleep issues (Constitutional)?No  Depression/anxiety (Psychiatric)?No  Fatigue (Constitutional)?No  Constipation (GI)?Yes - takes stool softeners  Urinary issues ()?No - some urge  Sexual dysfunction ()?No  Orthostasis (Cardiovascular)?No  Leg swelling (Cardiovascular)? No  Falls (Musculoskeletal)?No  Cognitive impairment (Neurologic)?No  Psychoses (Psychiatric)?No  Pain/Paresthesia (Neurologic)?Yes  Visual changes (Eyes)?No  Moles / skin changes (Skin)?No  Stridor / SOB (Pulm)?No  Bruising (Heme)?No    Past Medical History: The patient  has a past medical history of A-fib; Atrial flutter; Heart murmur; HIV (human immunodeficiency virus infection); Other and unspecified hyperlipidemia; SVT (supraventricular tachycardia); and Tachycardia.    Social History: The patient  reports that he has never smoked. He has never used smokeless tobacco. He reports that he does not drink alcohol or use drugs.    Family History: Their family history includes Breast cancer in his mother; Cancer in his father; Cirrhosis in his mother; Diabetes in his mother; Heart disease in his mother; Hypertension in his mother; Hypotension in his father; Lung cancer in his father.    Allergies:  Sulfa (sulfonamide antibiotics) and Latex, natural rubber     Meds:   Current Outpatient Prescriptions on File Prior to Visit   Medication Sig Dispense Refill    abacavir-lamivudine (EPZICOM) 600-300 mg per tablet Take 1 tablet by mouth once daily. 90 tablet 3    aspirin 81 mg TbEF Take 1 tablet by mouth once daily.      benazepril (LOTENSIN) 10 MG tablet Take 1 tablet (10 mg total) by mouth every evening. 90 tablet 3    buPROPion (WELLBUTRIN) 100 MG tablet Take 100 mg by mouth once daily.      cholecalciferol, vitamin D3, (VITAMIN D3) 2,000 unit Tab Take by mouth once daily.      darunavir (PREZISTA) 800 mg Tab Take 1 tablet (800 mg total) by mouth daily with breakfast. 30 tablet 5    diltiaZEM (CARDIZEM SR) 60 MG Cp12 TAKE 1 CAPSULE TWICE DAILY 60 capsule 11    docusate sodium (COLACE ORAL) Take 1 tablet by mouth 2 (two) times daily as needed.      ELIQUIS 2.5 mg Tab TAKE 1 TABLET (2.5 MG TOTAL) BY MOUTH 2 (TWO) TIMES DAILY. 60 tablet 5    flunisolide 25 mcg, 0.025%, (NASALIDE) 25 mcg (0.025 %) Spry 2 sprays by Nasal route 2 (two) times daily. 25 mL 5    FOLIC ACID/MULTIVITS-MIN (MEN'S DAILY FORMULA ORAL) Take 1 tablet by mouth once daily.      gabapentin (NEURONTIN) 300 MG capsule Take 1 capsule (300 mg total) by mouth 3 (three) times daily. May take 2 capsules at night (Patient taking differently: Take 300 mg by mouth 2 (two) times daily. May take 2 capsules at night) 120 capsule 5    NORVIR 100 mg Tab tablet Take 100 mg by mouth once daily.       omega-3 acid ethyl esters (LOVAZA) 1 gram capsule Take 2 capsules (2 g total) by mouth 2 (two) times daily. 360 capsule 3    oxybutynin (DITROPAN) 5 MG Tab Take 1 tablet (5 mg total) by mouth 3 (three) times daily. 90 tablet 6    pantoprazole (PROTONIX) 40 MG tablet TAKE 1 TABLET EVERY DAY 90 tablet 3    propranolol (INDERAL) 10 MG tablet Take 2 tablets (20 mg total) by mouth 2 (two) times daily. 120 tablet 11    rosuvastatin (CRESTOR) 40 MG Tab Take  1 tablet (40 mg total) by mouth every evening. 90 tablet 3    simethicone (GAS-X ORAL) Take 1 tablet by mouth daily as needed.      zolpidem (AMBIEN) 10 mg Tab Take 1 tablet (10 mg total) by mouth nightly as needed. 30 tablet 3     No current facility-administered medications on file prior to visit.        Exam:  /66   Pulse (!) 56   Ht 6' (1.829 m)   Wt 68.2 kg (150 lb 5.7 oz)   BMI 20.39 kg/m²     * Specialized movement exam  + hypophonic speech.    No facial masking.   No cogwheel rigidity.     No bradykinesia.   R>L tremor with posture - very mild - and no rest tremor now.   No other dystonia, chorea, athetosis, myoclonus, or tics.   No motor impersistence.   Normal-based gait.   No shortened stride length.   No abnormal arm swing.     No postural instability.      Laboratory/Radiological:  - Results:  Office Visit on 06/12/2018   Component Date Value Ref Range Status    POC Residual Urine Volume 06/12/2018 5  0 - 100 mL Final   Lab Visit on 05/31/2018   Component Date Value Ref Range Status    WBC 05/31/2018 4.70  3.90 - 12.70 K/uL Final    RBC 05/31/2018 4.25* 4.60 - 6.20 M/uL Final    Hemoglobin 05/31/2018 12.9* 14.0 - 18.0 g/dL Final    Hematocrit 05/31/2018 39.0* 40.0 - 54.0 % Final    MCV 05/31/2018 92  82 - 98 fL Final    MCH 05/31/2018 30.4  27.0 - 31.0 pg Final    MCHC 05/31/2018 33.1  32.0 - 36.0 g/dL Final    RDW 05/31/2018 12.6  11.5 - 14.5 % Final    Platelets 05/31/2018 171  150 - 350 K/uL Final    MPV 05/31/2018 10.4  9.2 - 12.9 fL Final    Gran # (ANC) 05/31/2018 2.7  1.8 - 7.7 K/uL Final    Lymph # 05/31/2018 1.0  1.0 - 4.8 K/uL Final    Mono # 05/31/2018 0.6  0.3 - 1.0 K/uL Final    Eos # 05/31/2018 0.4  0.0 - 0.5 K/uL Final    Baso # 05/31/2018 0.04  0.00 - 0.20 K/uL Final    nRBC 05/31/2018 0  0 /100 WBC Final    Gran% 05/31/2018 56.7  38.0 - 73.0 % Final    Lymph% 05/31/2018 21.7  18.0 - 48.0 % Final    Mono% 05/31/2018 12.6  4.0 - 15.0 % Final    Eosinophil%  05/31/2018 8.1* 0.0 - 8.0 % Final    Basophil% 05/31/2018 0.9  0.0 - 1.9 % Final    Differential Method 05/31/2018 Automated   Final    Sodium 05/31/2018 140  136 - 145 mmol/L Final    Potassium 05/31/2018 4.6  3.5 - 5.1 mmol/L Final    Chloride 05/31/2018 104  95 - 110 mmol/L Final    CO2 05/31/2018 29  23 - 29 mmol/L Final    Glucose 05/31/2018 100  70 - 110 mg/dL Final    BUN, Bld 05/31/2018 11  8 - 23 mg/dL Final    Creatinine 05/31/2018 1.1  0.5 - 1.4 mg/dL Final    Calcium 05/31/2018 9.7  8.7 - 10.5 mg/dL Final    Total Protein 05/31/2018 6.6  6.0 - 8.4 g/dL Final    Albumin 05/31/2018 3.6  3.5 - 5.2 g/dL Final    Total Bilirubin 05/31/2018 0.4  0.1 - 1.0 mg/dL Final    Alkaline Phosphatase 05/31/2018 68  55 - 135 U/L Final    AST 05/31/2018 24  10 - 40 U/L Final    ALT 05/31/2018 30  10 - 44 U/L Final    Anion Gap 05/31/2018 7* 8 - 16 mmol/L Final    eGFR if African American 05/31/2018 >60.0  >60 mL/min/1.73 m^2 Final    eGFR if non African American 05/31/2018 >60.0  >60 mL/min/1.73 m^2 Final    HIV-1 RNA, Qual 05/31/2018 Not detected  Not detected Final    HIV 1 RNA Ultra 05/31/2018 <40  <40 Copies/mL Final    Log HIV Copies/mL 05/31/2018 <1.60  <1.60 Log (10) Copies/mL Final    HIV UQ Date Received 05/31/2018 6/2/18   Final    HIV UQ Date Reported 05/31/2018 6/4/18   Final    CD4 % [place] T Cell 05/31/2018 41.2  28 - 57 % Final    Absolute CD4 05/31/2018 421  300 - 1400 cells/ul Final    Cholesterol 05/31/2018 167  120 - 199 mg/dL Final    Triglycerides 05/31/2018 80  30 - 150 mg/dL Final    HDL 05/31/2018 66  40 - 75 mg/dL Final    LDL Cholesterol 05/31/2018 85.0  63.0 - 159.0 mg/dL Final    HDL/Chol Ratio 05/31/2018 39.5  20.0 - 50.0 % Final    Total Cholesterol/HDL Ratio 05/31/2018 2.5  2.0 - 5.0 Final    Non-HDL Cholesterol 05/31/2018 101  mg/dL Final    Hemoglobin A1C 05/31/2018 5.3  4.0 - 5.6 % Final    Estimated Avg Glucose 05/31/2018 105  68 - 131 mg/dL Final     PSA, SCREEN 05/31/2018 0.65  0.00 - 4.00 ng/mL Final    Specimen UA 05/31/2018 Urine, Clean Catch   Final    Color, UA 05/31/2018 Yellow  Yellow, Straw, Kaity Final    Appearance, UA 05/31/2018 Clear  Clear Final    pH, UA 05/31/2018 5.0  5.0 - 8.0 Final    Specific Gravity, UA 05/31/2018 1.015  1.005 - 1.030 Final    Protein, UA 05/31/2018 Negative  Negative Final    Glucose, UA 05/31/2018 Negative  Negative Final    Ketones, UA 05/31/2018 Negative  Negative Final    Bilirubin (UA) 05/31/2018 Negative  Negative Final    Occult Blood UA 05/31/2018 Negative  Negative Final    Nitrite, UA 05/31/2018 Negative  Negative Final    Urobilinogen, UA 05/31/2018 Negative  <2.0 EU/dL Final    Leukocytes, UA 05/31/2018 Negative  Negative Final    RBC, UA 05/31/2018 0  0 - 4 /hpf Final    WBC, UA 05/31/2018 0  0 - 5 /hpf Final    Squam Epithel, UA 05/31/2018 0  /hpf Final    Microscopic Comment 05/31/2018 SEE COMMENT   Final       - Independent review of images:      Diagnoses:           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  No premotor features.  Has tolerated increases to primidone slowly over time.    Medical Decision Making:  - will increase the Inderal to 60 mg [place] now - spread out with dosing and I think will be more effective without significantly changing his BP/HR.  Risks and benefits discussed, especially LH/dizzy.     - primidone and tpm might induce his HAART and render then less effective  - tolerating low dose inderal right now - did NOT tolerate at 80 mg BID dosing several years ago (chest pain)            Tim Martinez MD, MPH  Division of Movement and Memory Disorders  [facility]  [phone]

## 2018-07-25 ENCOUNTER — OFFICE VISIT (OUTPATIENT)
Dept: NEUROLOGY | Facility: CLINIC | Age: 73
End: 2018-07-25
Payer: MEDICARE

## 2018-07-25 VITALS
RESPIRATION RATE: 18 BRPM | WEIGHT: 152.56 LBS | DIASTOLIC BLOOD PRESSURE: 68 MMHG | BODY MASS INDEX: 20.66 KG/M2 | HEIGHT: 72 IN | SYSTOLIC BLOOD PRESSURE: 122 MMHG | HEART RATE: 54 BPM

## 2018-07-25 DIAGNOSIS — G25.0 ESSENTIAL TREMOR: Primary | Chronic | ICD-10-CM

## 2018-07-25 DIAGNOSIS — B20 HIV (HUMAN IMMUNODEFICIENCY VIRUS INFECTION): Chronic | ICD-10-CM

## 2018-07-25 PROCEDURE — 99999 PR PBB SHADOW E&M-EST. PATIENT-LVL IV: CPT | Mod: PBBFAC,,, | Performed by: NURSE PRACTITIONER

## 2018-07-25 PROCEDURE — 99214 OFFICE O/P EST MOD 30 MIN: CPT | Mod: S$PBB | Performed by: NURSE PRACTITIONER

## 2018-07-25 PROCEDURE — 99214 OFFICE O/P EST MOD 30 MIN: CPT | Mod: PBBFAC | Performed by: NURSE PRACTITIONER

## 2018-07-25 PROCEDURE — 99999 PR STA SHADOW: CPT | Mod: PBBFAC,,, | Performed by: NURSE PRACTITIONER

## 2018-07-25 NOTE — PROGRESS NOTES
HPI: Tacho Guerrier Jr. is a 72 y.o. male with many years of benign essential tremor. He has a history of HIV, diagnosed in the 1990's. He also has depression.     He presents today for a follow up visit. He was referred to Dr. Martinez with movement disorders for an opinion on treatment for his tremor. His Propranolol was restarted and was titrated upward, which did help his tremor somewhat. His handwriting is improved at times. His tremor is worse with increased anxiety. He attempted to wean his antidepressant, but was unable to do so.     Review of Systems   Constitutional: Negative for malaise/fatigue.   Eyes: Negative for double vision.   Cardiovascular: Negative for chest pain.   Skin: Negative for rash.   Neurological: Positive for tremors and sensory change. Negative for tingling, focal weakness and weakness.   Psychiatric/Behavioral: Positive for depression. Negative for memory loss.       I have reviewed all of this patient's past medical and surgical histories as well as family and social histories and active allergies and medications as documented in the electronic medical record.    Exam:  Gen Appearance, well developed/nourished in no apparent distress  CV: 2+ distal pulses with no edema or swelling  Neuro:  MS: Awake, alert, oriented to place, person, time, situation. Sustains attention. Recent/remote memory intact, Language is full to spontaneous speech/repetition/naming/comprehension. Fund of Knowledge is full  CN: Optic discs are flat with normal vasculature, PERRL, Extraoccular movements and visual fields are full. Normal facial sensation and strength, Hearing symmetric, Tongue and Palate are midline and strong. Shoulder Shrug symmetric and strong.  Motor: Normal bulk, tone, no abnormal movements at rest but there is no-no tremor of the head at times and moderate tremor of outstretched hands. 5/5 strength bilateral upper/lower extremities with 1+ reflexes and no clonus  Sensory: symmetric to light  touch, pain, temp, and vibration Romberg negative  Cerebellar: Finger-nose,Heal-shin, Rapid alternating movements intact  Gait: Normal stance, no ataxia    Specialized movement exam  + hypophonic speech.    No facial masking.   No cogwheel rigidity.     No bradykinesia.   R>L tremor with posture - very mild - and no rest tremor now.   No other dystonia, chorea, athetosis, myoclonus, or tics.   No motor impersistence.   Normal-based gait.   No shortened stride length.   No abnormal arm swing.     No postural instability.      Labs: TSH this year is normal. CMP normal 7/2017    Assessment/Plan:Tacho Guerrier Jr. is a 72 y.o. male with many years of benign essential tremor.     I recommend:   1. Continue Propranolol for his essential tremor. He did see Dr. Martinez for an opinion, who restarted his Propranolol, and titrated his dose upward, which was more effective for his tremor. No response with 10 mg bid prior.   2. Gabapentin, which he takes for neuropathic complaints, experienced after starting HAART, has had no effect on his tremors, which also failed to respond to weaning Wellbutrin, which only resulted in worsening of his depression. His tremor predated his Wellbutrin use.   3. Primidone has been avoided, given his HAART use. It is likely that other agents, such as Topamax would also have an interaction with his HAART.   4. Note his HIV status currently with low or undetectable viral loads: Tremor unrelated and instead related to family history.      FU 6 months.

## 2018-07-26 ENCOUNTER — TELEPHONE (OUTPATIENT)
Dept: ADMINISTRATIVE | Facility: HOSPITAL | Age: 73
End: 2018-07-26

## 2019-02-27 ENCOUNTER — OFFICE VISIT (OUTPATIENT)
Dept: NEUROLOGY | Facility: CLINIC | Age: 74
End: 2019-02-27
Payer: MEDICARE

## 2019-02-27 VITALS
RESPIRATION RATE: 14 BRPM | SYSTOLIC BLOOD PRESSURE: 116 MMHG | HEART RATE: 56 BPM | BODY MASS INDEX: 20.6 KG/M2 | DIASTOLIC BLOOD PRESSURE: 66 MMHG | HEIGHT: 72 IN | WEIGHT: 152.13 LBS

## 2019-02-27 DIAGNOSIS — I10 ESSENTIAL HYPERTENSION: Chronic | ICD-10-CM

## 2019-02-27 DIAGNOSIS — F51.01 PRIMARY INSOMNIA: ICD-10-CM

## 2019-02-27 DIAGNOSIS — I48.91 ATRIAL FIBRILLATION, UNSPECIFIED TYPE: ICD-10-CM

## 2019-02-27 DIAGNOSIS — E78.2 MIXED HYPERLIPIDEMIA: Chronic | ICD-10-CM

## 2019-02-27 DIAGNOSIS — B20 HIV (HUMAN IMMUNODEFICIENCY VIRUS INFECTION): Chronic | ICD-10-CM

## 2019-02-27 DIAGNOSIS — G25.0 ESSENTIAL TREMOR: Primary | Chronic | ICD-10-CM

## 2019-02-27 PROCEDURE — 99999 PR PBB SHADOW E&M-EST. PATIENT-LVL V: ICD-10-PCS | Mod: PBBFAC,,, | Performed by: NURSE PRACTITIONER

## 2019-02-27 PROCEDURE — 99215 OFFICE O/P EST HI 40 MIN: CPT | Mod: PBBFAC | Performed by: NURSE PRACTITIONER

## 2019-02-27 PROCEDURE — 99999 PR PBB SHADOW E&M-EST. PATIENT-LVL V: CPT | Mod: PBBFAC,,, | Performed by: NURSE PRACTITIONER

## 2019-02-27 PROCEDURE — 99999 PR STA SHADOW: CPT | Mod: PBBFAC,,, | Performed by: NURSE PRACTITIONER

## 2019-02-27 PROCEDURE — 99214 OFFICE O/P EST MOD 30 MIN: CPT | Mod: S$PBB | Performed by: NURSE PRACTITIONER

## 2019-02-27 RX ORDER — PROPRANOLOL HYDROCHLORIDE 60 MG/1
60 CAPSULE, EXTENDED RELEASE ORAL DAILY
Qty: 30 CAPSULE | Refills: 5 | Status: SHIPPED | OUTPATIENT
Start: 2019-02-27 | End: 2019-08-27 | Stop reason: SDUPTHER

## 2019-02-27 NOTE — PROGRESS NOTES
HPI: Tacho Guerrier Jr. is a 73 y.o. male with many years of benign essential tremor. He has a history of HIV, diagnosed in the 1990's. He also has depression, HTN, HLD, insomnia, and A-fib and history of SVT.     He presents today for a follow up visit. His hand tremors seem slightly worse at times, though he does not feel that this is a significant progression. Propranolol continues to be helpful, though tremor remains somewhat annoying when writing. Tremor noted to be worse with anxiety prior.     He takes Gabapentin from another provider for neuropathic complaints.     Review of Systems   Constitutional: Negative for malaise/fatigue.   Eyes: Negative for double vision.   Cardiovascular: Negative for chest pain.   Skin: Negative for rash.   Neurological: Positive for tremors and sensory change. Negative for tingling, focal weakness and weakness.   Psychiatric/Behavioral: Negative for depression and memory loss.       I have reviewed all of this patient's past medical and surgical histories as well as family and social histories and active allergies and medications as documented in the electronic medical record.    Exam:  Gen Appearance, well developed/nourished in no apparent distress  CV: 2+ distal pulses with no edema or swelling  Neuro:  MS: Awake, alert, oriented to place, person, time, situation. Sustains attention. Recent/remote memory intact, Language is full to spontaneous speech/repetition/naming/comprehension. Fund of Knowledge is full  CN: Optic discs are flat with normal vasculature, PERRL, Extraoccular movements and visual fields are full. Normal facial sensation and strength, Hearing symmetric, Tongue and Palate are midline and strong. Shoulder Shrug symmetric and strong.  Motor: Normal bulk, tone, no abnormal movements at rest; mild tremor of outstretched hands; there is no head tremor noted today. 5/5 strength bilateral upper/lower extremities with 1+ reflexes and no clonus  Sensory: symmetric to  light touch, pain, temp, and vibration Romberg negative  Cerebellar: Finger-nose,Heal-shin, Rapid alternating movements intact  Gait: Normal stance, no ataxia    Specialized movement exam  + hypophonic speech.    No facial masking.   No cogwheel rigidity.     No bradykinesia.   R>L tremor with posture - very mild - and no rest tremor now.   No other dystonia, chorea, athetosis, myoclonus, or tics.   No motor impersistence.   Normal-based gait.   No shortened stride length.   No abnormal arm swing.     No postural instability.      Labs: TSH this year is normal. CMP normal 7/2017    Assessment/Plan: Tacho Guerrier Jr. is a 73 y.o. male with many years of benign essential tremor.     I recommend:   1. Continue Propranolol 60 mg for his essential tremor. He did see Dr. Martinez for an opinion, who restarted his Propranolol, and titrated his dose upward, which was more effective for his tremor. No response with 10 mg bid prior.   2. Gabapentin, which he takes for neuropathic complaints, experienced after starting HAART, has had no effect on his tremors, which also failed to respond to weaning Wellbutrin, which only resulted in worsening of his depression. His tremor predated his Wellbutrin use.   3. Primidone has been avoided, given his HAART use. It is likely that other agents, such as Topamax would also have an interaction with his HAART.   4. Note his HIV status currently with low or undetectable viral loads: Tremor unrelated and instead related to family history.   5. He takes Eliquis and has a history of A-fib.      FU 6 months

## 2019-03-07 ENCOUNTER — TELEPHONE (OUTPATIENT)
Dept: NEUROLOGY | Facility: CLINIC | Age: 74
End: 2019-03-07

## 2019-03-07 DIAGNOSIS — F41.9 ANXIETY: Primary | ICD-10-CM

## 2019-03-07 NOTE — TELEPHONE ENCOUNTER
----- Message from Rupinder Syed sent at 3/7/2019  2:23 PM CST -----  Contact: Self  Tacho Guerrier Jr.  MRN: [id number]  : 1945  PCP: Gene Yusuf  Home Phone      [phone]  Work Phone      Not on file.  Mobile          [phone]      MESSAGE:   Pt states his tremors are worsening and has questions about mental health. Please advise. [phone]

## 2019-03-07 NOTE — TELEPHONE ENCOUNTER
I will refer him to our psychiatrist at [facility]; however, he may not get in for a few months. In the meantime, he may want to consider [place] [facility] [place] care provider for immediate help with this anxiety.

## 2019-03-07 NOTE — TELEPHONE ENCOUNTER
"Patient states that his tremors are worsening and he thinks that he should see someone in mental health. He states that he "has something behind my mind that is bothering me". He has gone to mental health at [facility] but would prefer to stay local now. Please advise.  "

## 2019-03-07 NOTE — TELEPHONE ENCOUNTER
Our providers here at [facility] do not have availability until June. I contacted Dr Smiley's office to see what the wait is with him and was instructed to have the patient contact their office for a brief phone interview and scheduling. I notified the patient of this and gave Dr Smiley's contact information and that he should see his PCP, if needed, in the meantime while waiting for an appointment. I also instructed the patient to contact our office with any further concerns. Patient verbalized understanding.

## 2019-08-27 ENCOUNTER — OFFICE VISIT (OUTPATIENT)
Dept: NEUROLOGY | Facility: CLINIC | Age: 74
End: 2019-08-27
Payer: MEDICARE

## 2019-08-27 VITALS
HEIGHT: 72 IN | SYSTOLIC BLOOD PRESSURE: 110 MMHG | BODY MASS INDEX: 19.96 KG/M2 | WEIGHT: 147.38 LBS | RESPIRATION RATE: 16 BRPM | HEART RATE: 60 BPM | DIASTOLIC BLOOD PRESSURE: 70 MMHG

## 2019-08-27 DIAGNOSIS — N17.9 AKI (ACUTE KIDNEY INJURY): ICD-10-CM

## 2019-08-27 DIAGNOSIS — I10 ESSENTIAL HYPERTENSION: Chronic | ICD-10-CM

## 2019-08-27 DIAGNOSIS — I48.91 ATRIAL FIBRILLATION, UNSPECIFIED TYPE: ICD-10-CM

## 2019-08-27 DIAGNOSIS — E78.2 MIXED HYPERLIPIDEMIA: Chronic | ICD-10-CM

## 2019-08-27 DIAGNOSIS — B20 HIV (HUMAN IMMUNODEFICIENCY VIRUS INFECTION): Chronic | ICD-10-CM

## 2019-08-27 DIAGNOSIS — G25.0 ESSENTIAL TREMOR: Primary | Chronic | ICD-10-CM

## 2019-08-27 PROCEDURE — 99999 PR STA SHADOW: CPT | Mod: PBBFAC,,, | Performed by: NURSE PRACTITIONER

## 2019-08-27 PROCEDURE — 99999 PR PBB SHADOW E&M-EST. PATIENT-LVL V: ICD-10-PCS | Mod: PBBFAC,,, | Performed by: NURSE PRACTITIONER

## 2019-08-27 PROCEDURE — 99999 PR PBB SHADOW E&M-EST. PATIENT-LVL V: CPT | Mod: PBBFAC,,, | Performed by: NURSE PRACTITIONER

## 2019-08-27 PROCEDURE — 99214 OFFICE O/P EST MOD 30 MIN: CPT | Mod: S$PBB | Performed by: NURSE PRACTITIONER

## 2019-08-27 PROCEDURE — 99215 OFFICE O/P EST HI 40 MIN: CPT | Mod: PBBFAC | Performed by: NURSE PRACTITIONER

## 2019-08-27 RX ORDER — DEXTROMETHORPHAN HYDROBROMIDE, GUAIFENESIN 5; 100 MG/5ML; MG/5ML
650 LIQUID ORAL
COMMUNITY

## 2019-08-27 RX ORDER — PROPRANOLOL HYDROCHLORIDE 60 MG/1
60 CAPSULE, EXTENDED RELEASE ORAL DAILY
Qty: 30 CAPSULE | Refills: 5 | Status: SHIPPED | OUTPATIENT
Start: 2019-08-27 | End: 2020-02-27 | Stop reason: SDUPTHER

## 2019-08-27 NOTE — PROGRESS NOTES
HPI: Tacho Guerrier Jr. is a 73 y.o. male with many years of benign essential tremor. He has a history of HIV, diagnosed in the 1990's. He also has depression, HTN, HLD, insomnia, and A-fib and history of SVT.     He presents today for a follow up visit. His hand tremors seem slightly worse at times, more so with increased stress. He is currently caring for a family member transitioning into a nursing home. Propranolol continues to be helpful, though tremor remains somewhat annoying when writing. Tremor noted to be worse with anxiety prior.     He takes Gabapentin from another provider for neuropathic complaints, which remains effective.     Review of Systems   Constitutional: Negative for malaise/fatigue.   Eyes: Negative for double vision.   Cardiovascular: Negative for chest pain.   Skin: Negative for rash.   Neurological: Positive for tremors and sensory change. Negative for tingling, focal weakness and weakness.   Psychiatric/Behavioral: Negative for depression and memory loss.       I have reviewed all of this patient's past medical and surgical histories as well as family and social histories and active allergies and medications as documented in the electronic medical record.    Exam:  Gen Appearance, well developed/nourished in no apparent distress  CV: 2+ distal pulses with no edema or swelling  Neuro:  MS: Awake, alert, oriented to place, person, time, situation. Sustains attention. Recent/remote memory intact, Language is full to spontaneous speech/repetition/naming/comprehension. Fund of Knowledge is full  CN: Optic discs are flat with normal vasculature, PERRL, Extraoccular movements and visual fields are full. Normal facial sensation and strength, Hearing symmetric, Tongue and Palate are midline and strong. Shoulder Shrug symmetric and strong.  Motor: Normal bulk, tone, no abnormal movements at rest; mild tremor of outstretched hands; there is no head tremor noted today. 5/5 strength bilateral  upper/lower extremities with 1+ reflexes and no clonus  Sensory: symmetric to light touch, pain, temp, and vibration Romberg negative  Cerebellar: Finger-nose,Heal-shin, Rapid alternating movements intact  Gait: Normal stance, no ataxia    Specialized movement exam  + hypophonic speech.    No facial masking.   No cogwheel rigidity.     No bradykinesia.   R>L tremor with posture - very mild - and no rest tremor now.   No other dystonia, chorea, athetosis, myoclonus, or tics.   No motor impersistence.   Normal-based gait.   No shortened stride length.   No abnormal arm swing.     No postural instability.      Labs: TSH this year is normal. CMP normal 7/2017    Assessment/Plan: Tacho Guerrier Jr. is a 73 y.o. male with many years of benign essential tremor.     I recommend:   1. Continue Propranolol 60 mg for his essential tremor. He did see Dr. Martinez for an opinion, who restarted his Propranolol, and titrated his dose upward, which was more effective for his tremor. No response with 10 mg bid prior.   2. Gabapentin, which he takes for neuropathic complaints, experienced after starting HAART, has had no effect on his tremors, which also failed to respond to weaning Wellbutrin, which only resulted in worsening of his depression. His tremor predated his Wellbutrin use.   3. Primidone has been avoided, given his HAART use. It is likely that other agents, such as Topamax would also have an interaction with his HAART.   4. Note his HIV status currently with low or undetectable viral loads: Tremor unrelated and instead related to family history.   5. He takes Eliquis and has a history of A-fib.      FU 6 months

## 2020-02-27 ENCOUNTER — OFFICE VISIT (OUTPATIENT)
Dept: NEUROLOGY | Facility: CLINIC | Age: 75
End: 2020-02-27
Payer: MEDICARE

## 2020-02-27 VITALS
BODY MASS INDEX: 20.57 KG/M2 | HEART RATE: 55 BPM | WEIGHT: 151.88 LBS | RESPIRATION RATE: 10 BRPM | SYSTOLIC BLOOD PRESSURE: 120 MMHG | OXYGEN SATURATION: 95 % | HEIGHT: 72 IN | DIASTOLIC BLOOD PRESSURE: 78 MMHG

## 2020-02-27 DIAGNOSIS — I10 ESSENTIAL HYPERTENSION: Chronic | ICD-10-CM

## 2020-02-27 DIAGNOSIS — E78.2 MIXED HYPERLIPIDEMIA: Chronic | ICD-10-CM

## 2020-02-27 DIAGNOSIS — I27.20 PULMONARY HTN: ICD-10-CM

## 2020-02-27 DIAGNOSIS — I48.91 ATRIAL FIBRILLATION, UNSPECIFIED TYPE: ICD-10-CM

## 2020-02-27 DIAGNOSIS — M79.2 NEUROPATHIC PAIN: ICD-10-CM

## 2020-02-27 DIAGNOSIS — B20 HIV INFECTION, UNSPECIFIED SYMPTOM STATUS: Chronic | ICD-10-CM

## 2020-02-27 DIAGNOSIS — G25.0 ESSENTIAL TREMOR: Primary | Chronic | ICD-10-CM

## 2020-02-27 PROCEDURE — 99999 PR STA SHADOW: ICD-10-PCS | Mod: PBBFAC,,, | Performed by: NURSE PRACTITIONER

## 2020-02-27 PROCEDURE — 99999 PR PBB SHADOW E&M-EST. PATIENT-LVL V: CPT | Mod: PBBFAC,,, | Performed by: NURSE PRACTITIONER

## 2020-02-27 PROCEDURE — 99215 OFFICE O/P EST HI 40 MIN: CPT | Mod: PBBFAC | Performed by: NURSE PRACTITIONER

## 2020-02-27 PROCEDURE — 99999 PR STA SHADOW: CPT | Mod: PBBFAC,,, | Performed by: NURSE PRACTITIONER

## 2020-02-27 PROCEDURE — 99214 OFFICE O/P EST MOD 30 MIN: CPT | Mod: S$PBB | Performed by: NURSE PRACTITIONER

## 2020-02-27 RX ORDER — GABAPENTIN 300 MG/1
600 CAPSULE ORAL 3 TIMES DAILY
Qty: 120 CAPSULE | Refills: 5 | Status: SHIPPED | OUTPATIENT
Start: 2020-02-27 | End: 2020-04-29 | Stop reason: DRUGHIGH

## 2020-02-27 RX ORDER — PROPRANOLOL HYDROCHLORIDE 60 MG/1
60 CAPSULE, EXTENDED RELEASE ORAL DAILY
Qty: 30 CAPSULE | Refills: 5 | Status: SHIPPED | OUTPATIENT
Start: 2020-02-27 | End: 2020-04-29 | Stop reason: SDUPTHER

## 2020-02-27 NOTE — PROGRESS NOTES
HPI: Tacho Guerrier Jr. is a 74 y.o. male with many years of benign essential tremor. He has a history of HIV, diagnosed in the 1990's. He also has depression, HTN, HLD, insomnia, and A-fib and history of SVT.     He presents today for a follow up visit. His hand tremors are slightly worse at times. HR is 55 today on Propranolol, which has been helpful for his hand tremors. Denies anxiety at this time.     No other complaints today.     His neuropathic complaints are helped with Gabapentin per his PCP.     Review of Systems   Constitutional: Negative for malaise/fatigue.   Eyes: Negative for double vision.   Cardiovascular: Negative for chest pain.   Skin: Negative for rash.   Neurological: Positive for tremors and sensory change. Negative for tingling, focal weakness and weakness.   Psychiatric/Behavioral: Negative for depression and memory loss.       I have reviewed all of this patient's past medical and surgical histories as well as family and social histories and active allergies and medications as documented in the electronic medical record.    Exam:  Gen Appearance, well developed/nourished in no apparent distress  CV: 2+ distal pulses with no edema or swelling  Neuro:  MS: Awake, alert, oriented to place, person, time, situation. Sustains attention. Recent/remote memory intact, Language is full to spontaneous speech/repetition/naming/comprehension. Fund of Knowledge is full  CN: Optic discs are flat with normal vasculature, PERRL, Extraoccular movements and visual fields are full. Normal facial sensation and strength, Hearing symmetric, Tongue and Palate are midline and strong. Shoulder Shrug symmetric and strong.  Motor: Normal bulk, tone, no abnormal movements at rest; mild tremor of outstretched hands; there is no head tremor noted today. 5/5 strength bilateral upper/lower extremities with 1+ reflexes and no clonus  Sensory: symmetric to light touch, pain, temp, and vibration Romberg negative  Cerebellar:  Finger-nose,Heal-shin, Rapid alternating movements intact  Gait: Normal stance, no ataxia    Specialized movement exam  + hypophonic speech.    No facial masking.   No cogwheel rigidity.     No bradykinesia.   R>L tremor with posture - very mild - and no rest tremor now.   No other dystonia, chorea, athetosis, myoclonus, or tics.   No motor impersistence.   Normal-based gait.   No shortened stride length.   No abnormal arm swing.     No postural instability.      Labs: TSH this year is normal. CMP normal 7/2017    Assessment/Plan: Tacho Guerrier Jr. is a 74 y.o. male with many years of benign essential tremor.    I recommend:   1. Continue Propranolol 60 mg for his essential tremor. Cannot increase further, due to bradycardia, which he is tolerating well.   -He did see Dr. Martinez for an opinion, who restarted his Propranolol, and titrated his dose upward, which was more effective for his tremor. No response with 10 mg bid prior.   2. Gabapentin, which he takes for neuropathic complaints, experienced after starting HAART, has had no effect to date on his tremors, which also failed to respond to weaning Wellbutrin, which only resulted in worsening of his depression. His tremor predated his Wellbutrin use.   -Try increasing Gabapentin 300/300/600 to 600 mg tid for essential tremor complaints, in addition to his neuropathic complaints. Monitor for sedation, dizziness, mood changes, etc.   3. Primidone has been avoided, given his HAART use. It is likely that other agents, such as Topamax would also have an interaction with his HAART.   4. Note his HIV status currently with low or undetectable viral loads: Tremor unrelated and instead related to family history.   5. He takes Eliquis and has a history of A-fib.      FU 2 months

## 2020-04-29 ENCOUNTER — OFFICE VISIT (OUTPATIENT)
Dept: NEUROLOGY | Facility: CLINIC | Age: 75
End: 2020-04-29
Payer: MEDICARE

## 2020-04-29 DIAGNOSIS — B20 HIV INFECTION, UNSPECIFIED SYMPTOM STATUS: Primary | Chronic | ICD-10-CM

## 2020-04-29 DIAGNOSIS — F41.9 ANXIETY: ICD-10-CM

## 2020-04-29 DIAGNOSIS — G25.0 ESSENTIAL TREMOR: Chronic | ICD-10-CM

## 2020-04-29 DIAGNOSIS — M79.2 NEUROPATHIC PAIN: ICD-10-CM

## 2020-04-29 PROCEDURE — 99999 PR STA SHADOW: CPT | Mod: PBBFAC,,, | Performed by: NURSE PRACTITIONER

## 2020-04-29 PROCEDURE — 99441 PR PHYSICIAN TELEPHONE EVALUATION 5-10 MIN: CPT | Performed by: NURSE PRACTITIONER

## 2020-04-29 PROCEDURE — 99999 PR STA SHADOW: ICD-10-PCS | Mod: PBBFAC,,, | Performed by: NURSE PRACTITIONER

## 2020-04-29 RX ORDER — PROPRANOLOL HYDROCHLORIDE 60 MG/1
60 CAPSULE, EXTENDED RELEASE ORAL DAILY
Qty: 30 CAPSULE | Refills: 5 | Status: SHIPPED | OUTPATIENT
Start: 2020-04-29 | End: 2020-07-30 | Stop reason: SDUPTHER

## 2020-04-29 RX ORDER — GABAPENTIN 600 MG/1
600 TABLET ORAL 3 TIMES DAILY
Qty: 270 TABLET | Refills: 1 | Status: SHIPPED | OUTPATIENT
Start: 2020-04-29 | End: 2020-07-30

## 2020-04-29 NOTE — PROGRESS NOTES
HPI: Tacho Guerrier Jr. is a 74 y.o. male with many years of benign essential tremor. He has a history of HIV, diagnosed in the 1990's. He also has depression, HTN, HLD, insomnia, and A-fib and history of SVT.     The patient location is: home via telemed  The chief complaint leading to consultation is: follow up visit  Visit type: telephone  Total time spent with patient: 10 minutes  Each patient to whom he or she provides medical services by telemedicine is:  (1) informed of the relationship between the physician and patient and the respective role of any other health care provider with respect to management of the patient; and (2) notified that he or she may decline to receive medical services by telemedicine and may withdraw from such care at any time.    Notes:     He presents today for a follow up visit. His Gabapentin was increased to 600 mg tid for better control of his hand tremors. He was originally prescribed this for his neuropathic complaints per his PCP.     The Gabapentin increase was helpful for his hand tremors, until the start of the Corona virus, when they became somewhat worse. He does endorse some current anxiety.     No other complaints today.     His neuropathic complaints are helped with Gabapentin per his PCP.     Review of Systems   Constitutional: Negative for malaise/fatigue.   Eyes: Negative for double vision.   Cardiovascular: Negative for chest pain.   Skin: Negative for rash.   Neurological: Positive for tremors and sensory change. Negative for tingling, focal weakness and weakness.   Psychiatric/Behavioral: Negative for depression and memory loss. The patient is nervous/anxious.        I have reviewed all of this patient's past medical and surgical histories as well as family and social histories and active allergies and medications as documented in the electronic medical record.    Exam:  Gen Appearance: not evaluated due to telephone visit  CV: not evaluated due to telephone  visit  Neuro:  MS: Awake, alert, oriented to place, person, time, situation. Sustains attention. Recent/remote memory intact, Language is full to spontaneous speech/repetition/naming/comprehension. Fund of Knowledge is full  CN: done done due to telephone visit Motor: deferred due to telephone visit  Sensory: deferred due to telephone visit  Cerebellar: deferred due to telephone visit  Gait: deferred due to telephone visit    Specialized movement exam  + hypophonic speech.    No facial masking.   No cogwheel rigidity.     No bradykinesia.   R>L tremor with posture - very mild - and no rest tremor now.   No other dystonia, chorea, athetosis, myoclonus, or tics.   No motor impersistence.   Normal-based gait.   No shortened stride length.   No abnormal arm swing.     No postural instability.      Labs: TSH this year is normal. CMP normal 7/2017    Assessment/Plan: Tacho Guerrier Jr. is a 74 y.o. male with many years of benign essential tremor.    I recommend:   1. Continue Propranolol 60 mg for his essential tremor. Cannot increase further, due to bradycardia, which he is tolerating well.   -He did see Dr. Martinez for an opinion, who restarted his Propranolol, and titrated his dose upward, which was more effective for his tremor. No response with 10 mg bid prior.   2. Gabapentin, which he takes for neuropathic complaints, experienced after starting HAART, has had no effect to date on his tremors at 300/300/600, which also failed to respond to weaning Wellbutrin, which only resulted in worsening of his depression. His tremor predated his Wellbutrin use.   -He has had some reduced hand tremors with Gabapentin increase to 600 mg tid, until recently; however, anxiety involving Corona virus oubreak could be contributing. Will reevaluate him in a few months.   -Continue Gabapentin 600 mg tid for both his hand tremors and his neuropathic complaints. Originally started by his PCP for neuropathy.   3. Primidone has been  avoided, given his HAART use. It is likely that other agents, such as Topamax would also have an interaction with his HAART.   4. Note his HIV status currently with low or undetectable viral loads: Tremor unrelated and instead related to family history.   5. He takes Eliquis and has a history of A-fib.      FU 3 months

## 2020-06-09 ENCOUNTER — PATIENT OUTREACH (OUTPATIENT)
Dept: ADMINISTRATIVE | Facility: HOSPITAL | Age: 75
End: 2020-06-09

## 2020-07-24 ENCOUNTER — LAB VISIT (OUTPATIENT)
Dept: PRIMARY CARE CLINIC | Facility: OTHER | Age: 75
End: 2020-07-24
Attending: INTERNAL MEDICINE
Payer: MEDICARE

## 2020-07-24 DIAGNOSIS — Z03.818 ENCOUNTER FOR OBSERVATION FOR SUSPECTED EXPOSURE TO OTHER BIOLOGICAL AGENTS RULED OUT: ICD-10-CM

## 2020-07-24 PROCEDURE — U0003 INFECTIOUS AGENT DETECTION BY NUCLEIC ACID (DNA OR RNA); SEVERE ACUTE RESPIRATORY SYNDROME CORONAVIRUS 2 (SARS-COV-2) (CORONAVIRUS DISEASE [COVID-19]), AMPLIFIED PROBE TECHNIQUE, MAKING USE OF HIGH THROUGHPUT TECHNOLOGIES AS DESCRIBED BY CMS-2020-01-R: HCPCS

## 2020-07-27 LAB — SARS-COV-2 RNA RESP QL NAA+PROBE: NEGATIVE

## 2020-07-30 ENCOUNTER — OFFICE VISIT (OUTPATIENT)
Dept: NEUROLOGY | Facility: CLINIC | Age: 75
End: 2020-07-30
Payer: MEDICARE

## 2020-07-30 VITALS
BODY MASS INDEX: 20.14 KG/M2 | RESPIRATION RATE: 15 BRPM | WEIGHT: 148.69 LBS | SYSTOLIC BLOOD PRESSURE: 122 MMHG | DIASTOLIC BLOOD PRESSURE: 76 MMHG | HEIGHT: 72 IN | HEART RATE: 65 BPM

## 2020-07-30 DIAGNOSIS — G25.0 ESSENTIAL TREMOR: Primary | Chronic | ICD-10-CM

## 2020-07-30 DIAGNOSIS — M79.2 NEUROPATHIC PAIN: ICD-10-CM

## 2020-07-30 DIAGNOSIS — I10 ESSENTIAL HYPERTENSION: Chronic | ICD-10-CM

## 2020-07-30 PROCEDURE — 99999 PR STA SHADOW: CPT | Mod: PBBFAC,,, | Performed by: NURSE PRACTITIONER

## 2020-07-30 PROCEDURE — 99214 OFFICE O/P EST MOD 30 MIN: CPT | Mod: S$PBB | Performed by: NURSE PRACTITIONER

## 2020-07-30 PROCEDURE — 99999 PR PBB SHADOW E&M-EST. PATIENT-LVL V: CPT | Mod: PBBFAC,,, | Performed by: NURSE PRACTITIONER

## 2020-07-30 PROCEDURE — 99215 OFFICE O/P EST HI 40 MIN: CPT | Mod: PBBFAC | Performed by: NURSE PRACTITIONER

## 2020-07-30 PROCEDURE — 99999 PR STA SHADOW: ICD-10-PCS | Mod: PBBFAC,,, | Performed by: NURSE PRACTITIONER

## 2020-07-30 RX ORDER — PROPRANOLOL HYDROCHLORIDE 60 MG/1
60 CAPSULE, EXTENDED RELEASE ORAL DAILY
Qty: 30 CAPSULE | Refills: 5 | Status: SHIPPED | OUTPATIENT
Start: 2020-07-30 | End: 2021-03-24

## 2020-07-30 RX ORDER — GABAPENTIN 300 MG/1
600 CAPSULE ORAL 3 TIMES DAILY
Qty: 180 CAPSULE | Refills: 5 | Status: SHIPPED | OUTPATIENT
Start: 2020-07-30 | End: 2021-01-06

## 2020-07-30 NOTE — PROGRESS NOTES
HPI: Tacho Guerrier Jr. is a 74 y.o. male with many years of benign essential tremor. He has a history of HIV, diagnosed in the 1990's. He also has depression, HTN, HLD, insomnia, and A-fib and history of SVT.     He presents today for a follow up visit. Hand tremors continue to be helped with Gabapentin and Propranolol. Hand tremors worse with situational anxiety/stress.     His Gabapentin was changed to tablet form at his last visit, due to a dosage increase. He has had daytime fatigue, and difficulty waking each morning since the increase. He would like to go back to capsule form, as this was better tolerated.     Neuropathic complaints remain well controlled at this time.     No other complaints today.     Review of Systems   Constitutional: Negative for malaise/fatigue.   Eyes: Negative for double vision.   Cardiovascular: Negative for chest pain.   Skin: Negative for rash.   Neurological: Positive for tremors and sensory change. Negative for tingling, focal weakness and weakness.   Psychiatric/Behavioral: Negative for depression and memory loss. The patient is nervous/anxious.        I have reviewed all of this patient's past medical and surgical histories as well as family and social histories and active allergies and medications as documented in the electronic medical record.    am:  Gen Appearance, well developed/nourished in no apparent distress  CV: 2+ distal pulses with no edema or swelling  Neuro:  MS: Awake, alert, oriented to place, person, time, situation. Sustains attention. Recent/remote memory intact, Language is full to spontaneous speech/repetition/naming/comprehension. Fund of Knowledge is full  CN: Optic discs are flat with normal vasculature, PERRL, Extraoccular movements and visual fields are full. Normal facial sensation and strength, Hearing symmetric, Tongue and Palate are midline and strong. Shoulder Shrug symmetric and strong.  Motor: Normal bulk, tone, no abnormal movements at rest;  mild tremor of outstretched hands; there is no head tremor noted today. 5/5 strength bilateral upper/lower extremities with 1+ reflexes and no clonus  Sensory: symmetric to light touch, pain, temp, and vibration Romberg negative  Cerebellar: Finger-nose,Heal-shin, Rapid alternating movements intact  Gait: Normal stance, no ataxia    Specialized movement exam  + hypophonic speech.    No facial masking.   No cogwheel rigidity.     No bradykinesia.   R>L tremor with posture - very mild - and no rest tremor now.   No other dystonia, chorea, athetosis, myoclonus, or tics.   No motor impersistence.   Normal-based gait.   No shortened stride length.   No abnormal arm swing.     No postural instability.      Labs: TSH this year is normal. CMP normal 7/2017    Assessment/Plan: Tacho Guerrier Jr. is a 74 y.o. male with many years of benign essential tremor.    I recommend:   1. Continue Propranolol 60 mg for his essential tremor. Cannot increase further, due to relatively low HR, which he is tolerating well.   -He did see Dr. Martinez for an opinion, who restarted his Propranolol, and titrated his dose upward, which was more effective for his tremor. No response with 10 mg bid prior.   2. Increase to Gabapentin 600 mg tid was helpful for his hand tremors, but he tolerates the capsules much better than the tablets, as he experienced excess fatigue with tablet form of Gabapentin. Change tablets back to capsules.   -Gabapentin also taken for neuropathy.   3. Hand tremor predated Wellbutrin use and did not improve with Wellbutrin wean, which caused worsening anxiety/depression.   4. Primidone has been avoided, given his HAART use. It is likely that other agents, such as Topamax would also have an interaction with his HAART.   5. Note his HIV status currently with low or undetectable viral loads: Tremor unrelated and instead related to family history.   6. He takes Eliquis and has a history of A-fib.      FU 6 months

## 2021-01-28 ENCOUNTER — OFFICE VISIT (OUTPATIENT)
Dept: NEUROLOGY | Facility: CLINIC | Age: 76
End: 2021-01-28
Payer: MEDICARE

## 2021-01-28 VITALS
HEIGHT: 72 IN | WEIGHT: 155.31 LBS | HEART RATE: 58 BPM | DIASTOLIC BLOOD PRESSURE: 69 MMHG | TEMPERATURE: 97 F | BODY MASS INDEX: 21.04 KG/M2 | RESPIRATION RATE: 16 BRPM | SYSTOLIC BLOOD PRESSURE: 151 MMHG

## 2021-01-28 DIAGNOSIS — I48.91 ATRIAL FIBRILLATION, UNSPECIFIED TYPE: ICD-10-CM

## 2021-01-28 DIAGNOSIS — B20 HIV INFECTION, UNSPECIFIED SYMPTOM STATUS: Chronic | ICD-10-CM

## 2021-01-28 DIAGNOSIS — G62.9 NEUROPATHY: ICD-10-CM

## 2021-01-28 DIAGNOSIS — M79.2 NEUROPATHIC PAIN: ICD-10-CM

## 2021-01-28 DIAGNOSIS — G25.0 ESSENTIAL TREMOR: Primary | Chronic | ICD-10-CM

## 2021-01-28 PROCEDURE — 99214 OFFICE O/P EST MOD 30 MIN: CPT | Mod: S$PBB | Performed by: NURSE PRACTITIONER

## 2021-01-28 PROCEDURE — 99999 PR STA SHADOW: CPT | Mod: PBBFAC,,, | Performed by: NURSE PRACTITIONER

## 2021-01-28 PROCEDURE — 99999 PR PBB SHADOW E&M-EST. PATIENT-LVL V: CPT | Mod: PBBFAC,,, | Performed by: NURSE PRACTITIONER

## 2021-01-28 PROCEDURE — 99999 PR PBB SHADOW E&M-EST. PATIENT-LVL V: ICD-10-PCS | Mod: PBBFAC,,, | Performed by: NURSE PRACTITIONER

## 2021-01-28 PROCEDURE — 99215 OFFICE O/P EST HI 40 MIN: CPT | Mod: PBBFAC | Performed by: NURSE PRACTITIONER

## 2021-01-28 RX ORDER — GABAPENTIN 300 MG/1
600 CAPSULE ORAL 3 TIMES DAILY
Qty: 540 CAPSULE | Refills: 1 | Status: SHIPPED | OUTPATIENT
Start: 2021-01-28 | End: 2021-07-28 | Stop reason: SDUPTHER

## 2021-07-01 ENCOUNTER — PATIENT MESSAGE (OUTPATIENT)
Dept: ADMINISTRATIVE | Facility: OTHER | Age: 76
End: 2021-07-01

## 2021-07-28 ENCOUNTER — OFFICE VISIT (OUTPATIENT)
Dept: NEUROLOGY | Facility: CLINIC | Age: 76
End: 2021-07-28
Payer: MEDICARE

## 2021-07-28 VITALS
WEIGHT: 149.94 LBS | HEART RATE: 51 BPM | BODY MASS INDEX: 20.31 KG/M2 | SYSTOLIC BLOOD PRESSURE: 110 MMHG | DIASTOLIC BLOOD PRESSURE: 68 MMHG | HEIGHT: 72 IN | OXYGEN SATURATION: 98 %

## 2021-07-28 DIAGNOSIS — G25.0 ESSENTIAL TREMOR: Primary | Chronic | ICD-10-CM

## 2021-07-28 DIAGNOSIS — M79.2 NEUROPATHIC PAIN: ICD-10-CM

## 2021-07-28 DIAGNOSIS — G62.9 NEUROPATHY: ICD-10-CM

## 2021-07-28 PROCEDURE — 99999 PR STA SHADOW: CPT | Mod: PBBFAC,,, | Performed by: NURSE PRACTITIONER

## 2021-07-28 PROCEDURE — 99214 OFFICE O/P EST MOD 30 MIN: CPT | Mod: PBBFAC | Performed by: NURSE PRACTITIONER

## 2021-07-28 PROCEDURE — 99999 PR PBB SHADOW E&M-EST. PATIENT-LVL IV: ICD-10-PCS | Mod: PBBFAC,,, | Performed by: NURSE PRACTITIONER

## 2021-07-28 PROCEDURE — 99999 PR PBB SHADOW E&M-EST. PATIENT-LVL IV: CPT | Mod: PBBFAC,,, | Performed by: NURSE PRACTITIONER

## 2021-07-28 PROCEDURE — 99213 OFFICE O/P EST LOW 20 MIN: CPT | Mod: S$PBB | Performed by: NURSE PRACTITIONER

## 2021-07-28 RX ORDER — GABAPENTIN 300 MG/1
600 CAPSULE ORAL 3 TIMES DAILY
Qty: 540 CAPSULE | Refills: 1 | Status: SHIPPED | OUTPATIENT
Start: 2021-07-28 | End: 2022-01-26 | Stop reason: SDUPTHER

## 2021-07-28 RX ORDER — PROPRANOLOL HYDROCHLORIDE 60 MG/1
60 CAPSULE, EXTENDED RELEASE ORAL DAILY
Qty: 90 CAPSULE | Refills: 1 | Status: SHIPPED | OUTPATIENT
Start: 2021-07-28 | End: 2021-12-14

## 2021-09-20 ENCOUNTER — TELEPHONE (OUTPATIENT)
Dept: NEUROLOGY | Facility: CLINIC | Age: 76
End: 2021-09-20

## 2022-01-26 ENCOUNTER — OFFICE VISIT (OUTPATIENT)
Dept: NEUROLOGY | Facility: CLINIC | Age: 77
End: 2022-01-26
Payer: MEDICARE

## 2022-01-26 VITALS
SYSTOLIC BLOOD PRESSURE: 140 MMHG | WEIGHT: 154.31 LBS | HEIGHT: 72 IN | RESPIRATION RATE: 18 BRPM | DIASTOLIC BLOOD PRESSURE: 70 MMHG | HEART RATE: 65 BPM | BODY MASS INDEX: 20.9 KG/M2 | OXYGEN SATURATION: 98 %

## 2022-01-26 DIAGNOSIS — M79.2 NEUROPATHIC PAIN: ICD-10-CM

## 2022-01-26 DIAGNOSIS — G25.0 ESSENTIAL TREMOR: Chronic | ICD-10-CM

## 2022-01-26 PROCEDURE — 99999 PR STA SHADOW: ICD-10-PCS | Mod: PBBFAC,,, | Performed by: NURSE PRACTITIONER

## 2022-01-26 PROCEDURE — 99215 OFFICE O/P EST HI 40 MIN: CPT | Mod: PBBFAC | Performed by: NURSE PRACTITIONER

## 2022-01-26 PROCEDURE — 99999 PR STA SHADOW: CPT | Mod: PBBFAC,,, | Performed by: NURSE PRACTITIONER

## 2022-01-26 PROCEDURE — 99999 PR PBB SHADOW E&M-EST. PATIENT-LVL V: CPT | Mod: PBBFAC,,, | Performed by: NURSE PRACTITIONER

## 2022-01-26 PROCEDURE — 99213 OFFICE O/P EST LOW 20 MIN: CPT | Mod: S$PBB | Performed by: NURSE PRACTITIONER

## 2022-01-26 RX ORDER — GABAPENTIN 300 MG/1
600 CAPSULE ORAL 3 TIMES DAILY
Qty: 540 CAPSULE | Refills: 1 | Status: SHIPPED | OUTPATIENT
Start: 2022-01-26 | End: 2022-07-26 | Stop reason: SDUPTHER

## 2022-01-26 RX ORDER — PROPRANOLOL HYDROCHLORIDE 60 MG/1
60 CAPSULE, EXTENDED RELEASE ORAL DAILY
Qty: 90 CAPSULE | Refills: 1 | Status: SHIPPED | OUTPATIENT
Start: 2022-01-26 | End: 2022-07-26 | Stop reason: SDUPTHER

## 2022-01-26 NOTE — PROGRESS NOTES
HPI: Tacho Guerrier Jr. is a 76 y.o. male with many years of benign essential tremor. He has a history of HIV, diagnosed in the 1990's. He also has depression, HTN, HLD, insomnia, and A-fib and history of SVT.     He presents today for a follow up visit. Gabapentin continues to control his tremors.     Tremors are now tolerable. This was also helpful for his neuropathy complaints. He is tolerating this increase well.     No falls.     He requires capsule form, rather than tablet form of Gabapentin, due to fatigue with tablet form. He also continues on Propranolol for tremor. HR 51 today, but he denies dizziness or fatigue.     No other complaints today.     Review of Systems   Constitutional: Negative for malaise/fatigue.   Eyes: Negative for double vision.   Cardiovascular: Negative for chest pain.   Skin: Negative for rash.   Neurological: Positive for tremors and sensory change. Negative for tingling, focal weakness and weakness.   Psychiatric/Behavioral: Negative for depression and memory loss. The patient is not nervous/anxious.        I have reviewed all of this patient's past medical and surgical histories as well as family and social histories and active allergies and medications as documented in the electronic medical record.    Exam:  Gen Appearance, well developed/nourished in no apparent distress  CV: 2+ distal pulses with no edema or swelling  Neuro:  MS: Awake, alert, oriented to place, person, time, situation. Sustains attention. Recent/remote memory intact, Language is full to spontaneous speech/repetition/naming/comprehension. Fund of Knowledge is full  CN: Optic discs are flat with normal vasculature, PERRL, Extraoccular movements and visual fields are full. Normal facial sensation and strength, Hearing symmetric, Tongue and Palate are midline and strong. Shoulder Shrug symmetric and strong.  Motor: Normal bulk, tone, no abnormal movements at rest; mild tremor of outstretched hands; there is no head  tremor noted today. 5/5 strength bilateral upper/lower extremities with 1+ reflexes and no clonus  Sensory: symmetric to light touch, pain, temp, and vibration Romberg negative  Cerebellar: Finger-nose,Heal-shin, Rapid alternating movements intact  Gait: Normal stance, no ataxia    Specialized movement exam  + hypophonic speech.    No facial masking.   No cogwheel rigidity.     No bradykinesia.   R>L tremor with posture - very mild - and no rest tremor now.   No other dystonia, chorea, athetosis, myoclonus, or tics.   No motor impersistence.   Normal-based gait.   No shortened stride length.   No abnormal arm swing.     No postural instability.      Labs:   TSH this year is normal. CMP normal 7/2017    Assessment/Plan: Tacho Guerrier Jr. is a 76 y.o. male with many years of benign essential tremor.    I recommend:   1. Continue Propranolol 60 mg for his essential tremor. Cannot increase further, given his bradycardia (HR 51 today), which he is tolerating well.   -He did see Dr. Martinez for an opinion, who restarted his Propranolol, and titrated his dose upward, which was more effective for his tremor. No response with 10 mg bid prior.     2. Continue Gabapentin 600 mg tid for hand tremors and neuropathy.   -He tolerates the capsules much better than the tablets, as he experienced excess fatigue with tablet form of Gabapentin. Changed tablets back to capsules.     3. Hand tremor predated Wellbutrin use and did not improve with Wellbutrin wean, which caused worsening anxiety/depression.     4. Primidone has been avoided, given his HAART use. It is likely that other agents, such as Topamax would also have an interaction with his HAART.     5. Note his HIV status currently with low or undetectable viral loads: Tremor unrelated and instead related to family history.     6. He takes Eliquis and has a history of A-fib.      FU 6 months

## 2022-04-04 ENCOUNTER — OFFICE VISIT (OUTPATIENT)
Dept: URGENT CARE | Facility: CLINIC | Age: 77
End: 2022-04-04
Payer: MEDICARE

## 2022-04-04 VITALS
OXYGEN SATURATION: 97 % | DIASTOLIC BLOOD PRESSURE: 80 MMHG | BODY MASS INDEX: 20.05 KG/M2 | HEIGHT: 72 IN | RESPIRATION RATE: 16 BRPM | SYSTOLIC BLOOD PRESSURE: 158 MMHG | WEIGHT: 148 LBS | TEMPERATURE: 98 F | HEART RATE: 60 BPM

## 2022-04-04 DIAGNOSIS — Z11.59 SCREENING FOR VIRAL DISEASE: ICD-10-CM

## 2022-04-04 DIAGNOSIS — J06.9 VIRAL URI WITH COUGH: Primary | ICD-10-CM

## 2022-04-04 LAB
CTP QC/QA: YES
POC MOLECULAR INFLUENZA A AGN: NEGATIVE
POC MOLECULAR INFLUENZA B AGN: NEGATIVE

## 2022-04-04 PROCEDURE — 99203 PR OFFICE/OUTPT VISIT, NEW, LEVL III, 30-44 MIN: ICD-10-PCS | Mod: S$GLB,,, | Performed by: NURSE PRACTITIONER

## 2022-04-04 PROCEDURE — 99203 OFFICE O/P NEW LOW 30 MIN: CPT | Mod: S$GLB,,, | Performed by: NURSE PRACTITIONER

## 2022-04-04 PROCEDURE — 87502 POCT INFLUENZA A/B MOLECULAR: ICD-10-PCS | Mod: QW,S$GLB,, | Performed by: NURSE PRACTITIONER

## 2022-04-04 PROCEDURE — 87502 INFLUENZA DNA AMP PROBE: CPT | Mod: QW,S$GLB,, | Performed by: NURSE PRACTITIONER

## 2022-04-04 RX ORDER — FLUTICASONE PROPIONATE 50 MCG
1 SPRAY, SUSPENSION (ML) NASAL DAILY
Qty: 9.9 ML | Refills: 0 | Status: SHIPPED | OUTPATIENT
Start: 2022-04-04

## 2022-04-04 RX ORDER — BENZONATATE 200 MG/1
200 CAPSULE ORAL 3 TIMES DAILY PRN
Qty: 20 CAPSULE | Refills: 0 | Status: SHIPPED | OUTPATIENT
Start: 2022-04-04 | End: 2022-04-11

## 2022-04-04 RX ORDER — CETIRIZINE HYDROCHLORIDE 10 MG/1
10 TABLET ORAL DAILY
Qty: 10 TABLET | Refills: 0 | Status: SHIPPED | OUTPATIENT
Start: 2022-04-04 | End: 2022-07-26

## 2022-04-04 NOTE — PROGRESS NOTES
Subjective:       Patient ID: Tacho Guerrier Jr. is a 76 y.o. male.    Vitals:  height is 6' (1.829 m) and weight is 67.1 kg (148 lb). His oral temperature is 98.1 °F (36.7 °C). His blood pressure is 158/80 (abnormal) and his pulse is 60. His respiration is 16 and oxygen saturation is 97%.     Chief Complaint: Cough    76 year old male reports he received his covid booster shot on Tuesday with his symptoms starting Friday. He complaints of watery eyes, postnasal drip and sore throat.    Cough  This is a new (Pt c/o productive cough (yellow), sore throat, and body aches. x2 days. ) problem. The current episode started in the past 7 days. The cough is productive of purulent sputum. Associated symptoms include postnasal drip and a sore throat. Nothing aggravates the symptoms. Treatments tried: benadryl. The treatment provided no relief.       Constitution: Negative.   HENT: Positive for postnasal drip and sore throat.    Neck: neck negative.   Cardiovascular: Negative.    Respiratory: Positive for cough and sputum production.        Objective:      Physical Exam   Constitutional: He is oriented to person, place, and time. He appears well-developed. He is cooperative.  Non-toxic appearance. He does not appear ill. No distress.   HENT:   Head: Normocephalic and atraumatic.   Ears:   Right Ear: Hearing, tympanic membrane, external ear and ear canal normal.   Left Ear: Hearing, tympanic membrane, external ear and ear canal normal.   Nose: Congestion present. No mucosal edema, rhinorrhea or nasal deformity. No epistaxis. Right sinus exhibits no maxillary sinus tenderness and no frontal sinus tenderness. Left sinus exhibits no maxillary sinus tenderness and no frontal sinus tenderness.   Mouth/Throat: Uvula is midline, oropharynx is clear and moist and mucous membranes are normal. No trismus in the jaw. Normal dentition. No uvula swelling. No oropharyngeal exudate, posterior oropharyngeal edema or posterior oropharyngeal  erythema.   Eyes: Conjunctivae and lids are normal. No scleral icterus.   Neck: Trachea normal and phonation normal. Neck supple. No edema present. No erythema present. No neck rigidity present.   Cardiovascular: Normal rate, regular rhythm, normal heart sounds and normal pulses.   Pulmonary/Chest: Effort normal and breath sounds normal. No stridor. No respiratory distress. He has no decreased breath sounds. He has no wheezes. He has no rhonchi. He has no rales. He exhibits no tenderness.   Abdominal: Normal appearance.   Musculoskeletal: Normal range of motion.         General: No deformity. Normal range of motion.   Neurological: He is alert and oriented to person, place, and time. He exhibits normal muscle tone. Coordination normal.   Skin: Skin is warm, dry, intact, not diaphoretic and not pale.   Psychiatric: His speech is normal and behavior is normal. Judgment and thought content normal.   Nursing note and vitals reviewed.        Assessment:       1. Viral URI with cough    2. Screening for viral disease          Plan:         Viral URI with cough  -     cetirizine (ZYRTEC) 10 MG tablet; Take 1 tablet (10 mg total) by mouth once daily. for 10 days  Dispense: 10 tablet; Refill: 0  -     fluticasone propionate (FLONASE) 50 mcg/actuation nasal spray; 1 spray (50 mcg total) by Each Nostril route once daily.  Dispense: 9.9 mL; Refill: 0  -     benzonatate (TESSALON) 200 MG capsule; Take 1 capsule (200 mg total) by mouth 3 (three) times daily as needed for Cough.  Dispense: 20 capsule; Refill: 0    Screening for viral disease  -     POCT Influenza A/B MOLECULAR    discussed symptomatic tx. Pt advised to f/u with his pcp. Should any symptoms worsen go to the nearest ED or rtc.   Results for orders placed or performed in visit on 04/04/22   POCT Influenza A/B MOLECULAR   Result Value Ref Range    POC Molecular Influenza A Ag Negative Negative, Not Reported    POC Molecular Influenza B Ag Negative Negative, Not  Reported     Acceptable Yes      Patient Instructions   You have been diagnosed with a viral illness. Antibiotics will not help your infection to go away any faster.  You immune system must fight this illness.  You will likely have symptoms for 7-10 days as this is how long a typical virus lasts.  Below are a few things that you can do at home to help yourself feel better in the mean time.     1.  For patients above 6 months of age who are not allergic to and are not on anticoagulants, you can alternate Tylenol and Motrin every 4-6 hours for fever above 100.4F and/or pain.  For patients less than 6 months of age, allergic to or intolerant to NSAIDS, have gastritis, gastric ulcers, or history of GI bleeds, are pregnant, or are on anticoagulant therapy, you can take Tylenol every 4 hours as needed for fever above 100.4F and/or pain.     2.  Rest and keep yourself well hydrated.  Drink hot liquids (coffee, water, tea, hot chocolate, or soup) 10-12 times a day for 5-7 days.  Put liquid in a mug and place in microwave for 2.5 - 3 minutes. Pour hot liquid into another mug not used to microwave the liquid (to avoid burning your mouth) then sniff the steam from the cup and sip the heated liquid.    3.  You can use these over the counter medications/remedies to help with your symptoms:     Runny Nose:  Use an antihistamine such as Claritin, Zyrtec or Allegra to help dry you out.     Congestion:  Use pseudoephedrine (behind the counter) for congestion- Pseudoephedrine 30 mg up to 240 mg /day. Warning:  It can raise your blood pressure and give you palpitations.  Coricidin HBP is okay to use if you have high blood pressure.     Use mucinex (guaifenisin) up to 2400mg/day to break up/loosen any mucous. MucinexDM has a cough suppressant that can be used for cough and at night to stop the tickle in the back of your throat.    Use Nasal Saline to mechanically move any post nasal drip from your eustachian tubes or  from the back of your throat.    Use Afrin in each nare, for no longer than 3 days, as it is addictive. It can also dry out your mucous membranes and cause elevated blood pressure.    Use Flonase 1-2 sprays/nostril per day. It is a local acting steroid nasal spray.  If you develop a bloody nose, stop using the medication immediately.    Sore throat:  Use warm, salt water gargles to ease your throat pain- 1/2 tsp salt to 1 cup warm water, gargle as desired.  Chloraseptic sprays and throat lozenges will also help to ease throat pain.     Sometimes Nyquil at night is beneficial to help you get some rest; however, it is sedating and does contain an antihistamine and Tylenol.  Make sure not to double up on these medications.      These things will help you to feel better and will speed your recovery.  If your condition fails to improve in a timely manner, you should receive another evaluation by your Primary Care Provider/Pediatrician to discuss your concerns or return to urgent care for a recheck.  If your condition worsens at any time, you should report immediately to your nearest Emergency Department for further evaluation. **You must understand that you have received Urgent Care treatment only and that you may be released before all of your medical problems are known or treated. You, the patient, are responsible to arrange for follow-up care as instructed.

## 2022-05-09 ENCOUNTER — OFFICE VISIT (OUTPATIENT)
Dept: URGENT CARE | Facility: CLINIC | Age: 77
End: 2022-05-09
Payer: MEDICARE

## 2022-05-09 VITALS
HEART RATE: 50 BPM | HEIGHT: 72 IN | OXYGEN SATURATION: 98 % | SYSTOLIC BLOOD PRESSURE: 138 MMHG | TEMPERATURE: 98 F | DIASTOLIC BLOOD PRESSURE: 68 MMHG | WEIGHT: 148 LBS | RESPIRATION RATE: 18 BRPM | BODY MASS INDEX: 20.05 KG/M2

## 2022-05-09 DIAGNOSIS — S50.311A ABRASION OF RIGHT ELBOW, INITIAL ENCOUNTER: Primary | ICD-10-CM

## 2022-05-09 PROCEDURE — 99214 OFFICE O/P EST MOD 30 MIN: CPT | Mod: S$GLB,,, | Performed by: FAMILY MEDICINE

## 2022-05-09 PROCEDURE — 99214 PR OFFICE/OUTPT VISIT, EST, LEVL IV, 30-39 MIN: ICD-10-PCS | Mod: S$GLB,,, | Performed by: FAMILY MEDICINE

## 2022-05-09 RX ORDER — MUPIROCIN 20 MG/G
OINTMENT TOPICAL 2 TIMES DAILY
Qty: 30 G | Refills: 0 | Status: SHIPPED | OUTPATIENT
Start: 2022-05-09 | End: 2023-07-27

## 2022-05-09 RX ORDER — CEPHALEXIN 250 MG/1
250 CAPSULE ORAL 4 TIMES DAILY
Qty: 40 CAPSULE | Refills: 0 | Status: SHIPPED | OUTPATIENT
Start: 2022-05-09 | End: 2022-05-19

## 2022-05-09 NOTE — PATIENT INSTRUCTIONS
Please drink plenty of fluids.  Please get plenty of rest.  Please return here or go to the Emergency Department for any concerns or worsening of condition.  If you were prescribed antibiotics, please take them to completion.  If you were prescribed a narcotic medication, do not drive or operate heavy equipment or machinery while taking these medications.      If not allergic, please take over the counter Tylenol (Acetaminophen) and/or Motrin (Ibuprofen) as directed for control of pain and/or fever.    Your tetanus was brought up to date if needed.    Keep wound clean and dry.  You may use a plastic bag to keep area dry while showering    Clean wound once or twice a day with soap and water, then apply antibiotic ointment and redress wound.        Please follow up with your primary care doctor or specialist as needed.  Gene Yusuf MD  [phone]    You must understand that you have received treatment at an Urgent Care facility only, and that you may be  released before all of your medical problems are known or treated. Urgent Care facilities are not equipped to  handle life threatening emergencies. It is recommended that you seek care at an Emergency Department for  further evaluation of worsening or concerning symptoms, or possibly life threatening conditions as  discussed.

## 2022-05-09 NOTE — LETTER
May 9, 2022  Tacho Guerrier .  [address]  [place] [address]                [facility]  [address], [address]  [place] [address]  Phone: [phone]  Fax: [phone] Tacho Guerrier was seen and treated in our Urgent Care department on 5/9/2022. He may return to work in 2 - 3 days.      If you have any questions or concerns, please don't hesitate to call.        Sincerely,        Pierre Wolfe MD

## 2022-05-09 NOTE — PROGRESS NOTES
Subjective:       Patient ID: Tacho Guerrier Jr. is a 76 y.o. male.    Vitals:  height is 6' (1.829 m) and weight is 67.1 kg (148 lb). His oral temperature is 98.3 °F (36.8 °C). His blood pressure is 138/68 and his pulse is 50 (abnormal). His respiration is 18 and oxygen saturation is 98%.     Chief Complaint: Fall (Small abrasion on right arm and shoulder. Patient stated he tripped over an object )    Fall  The accident occurred 3 to 5 days ago. The fall occurred while walking. He fell from an unknown height. He landed on concrete. The point of impact was the right shoulder. The pain is present in the right shoulder and right upper arm. The pain is at a severity of 5/10. The pain is moderate. He has tried nothing for the symptoms. The treatment provided no relief.       Constitution: Negative.   HENT: Negative.    Cardiovascular: Negative.    Eyes: Negative.    Respiratory: Negative.    Gastrointestinal: Negative.    Endocrine: negative.   Genitourinary: Negative.    Musculoskeletal: Negative.    Skin: Negative.    Allergic/Immunologic: Negative.    Neurological: Negative.    Hematologic/Lymphatic: Negative.    Psychiatric/Behavioral: Negative.        Objective:      Physical Exam   Constitutional: He is oriented to person, place, and time. He appears well-developed. He is cooperative.  Non-toxic appearance. He does not appear ill. No distress.   HENT:   Head: Normocephalic and atraumatic. Head is without abrasion, without contusion and without laceration.   Ears:   Right Ear: Hearing, tympanic membrane, external ear and ear canal normal. No hemotympanum.   Left Ear: Hearing, tympanic membrane, external ear and ear canal normal. No hemotympanum.   Nose: Nose normal. No mucosal edema, rhinorrhea or nasal deformity. No epistaxis. Right sinus exhibits no maxillary sinus tenderness and no frontal sinus tenderness. Left sinus exhibits no maxillary sinus tenderness and no frontal sinus tenderness.   Mouth/Throat: Uvula is  midline, oropharynx is clear and moist and mucous membranes are normal. No trismus in the jaw. Normal dentition. No uvula swelling. No posterior oropharyngeal erythema.   Eyes: Conjunctivae, EOM and lids are normal. Pupils are equal, round, and reactive to light. Right eye exhibits no discharge. Left eye exhibits no discharge. No scleral icterus.   Neck: Trachea normal and phonation normal. Neck supple. No tracheal deviation present. No neck rigidity present. No spinous process tenderness present. No muscular tenderness present.   Cardiovascular: Normal rate, regular rhythm, normal heart sounds and normal pulses.   Pulmonary/Chest: Effort normal and breath sounds normal. No respiratory distress.   Abdominal: Normal appearance and bowel sounds are normal. He exhibits no distension, no pulsatile midline mass and no mass. Soft. There is no abdominal tenderness.   Musculoskeletal: Normal range of motion.         General: No deformity. Normal range of motion.        Arms:    Neurological: He is alert and oriented to person, place, and time. He has normal strength. No cranial nerve deficit or sensory deficit. He exhibits normal muscle tone. He displays no seizure activity. Coordination normal. GCS eye subscore is 4. GCS verbal subscore is 5. GCS motor subscore is 6.   Skin: Skin is warm, dry, intact, not diaphoretic and not pale. Capillary refill takes less than 2 seconds. No abrasion, No burn, No bruising and No ecchymosis   Psychiatric: His speech is normal and behavior is normal. Judgment and thought content normal.   Nursing note and vitals reviewed.        Assessment:       1. Abrasion of right elbow, initial encounter          Plan:         Abrasion of right elbow, initial encounter  -     cephALEXin (KEFLEX) 250 MG capsule; Take 1 capsule (250 mg total) by mouth 4 (four) times daily. for 10 days  Dispense: 40 capsule; Refill: 0  -     mupirocin (BACTROBAN) 2 % ointment; Apply topically 2 (two) times daily.   Dispense: 30 g; Refill: 0    Please drink plenty of fluids.  Please get plenty of rest.  Please return here or go to the Emergency Department for any concerns or worsening of condition.  If you were prescribed antibiotics, please take them to completion.  If you were prescribed a narcotic medication, do not drive or operate heavy equipment or machinery while taking these medications.      If not allergic, please take over the counter Tylenol (Acetaminophen) and/or Motrin (Ibuprofen) as directed for control of pain and/or fever.    Your tetanus was brought up to date if needed.    Keep wound clean and dry.  You may use a plastic bag to keep area dry while showering    Clean wound once or twice a day with soap and water, then apply antibiotic ointment and redress wound.      Please follow up with your primary care doctor or specialist as needed.  Gene Yusuf MD  [phone]    You must understand that you have received treatment at an Urgent Care facility only, and that you may be  released before all of your medical problems are known or treated. Urgent Care facilities are not equipped to  handle life threatening emergencies. It is recommended that you seek care at an Emergency Department for  further evaluation of worsening or concerning symptoms, or possibly life threatening conditions as  discussed.

## 2022-06-12 ENCOUNTER — OFFICE VISIT (OUTPATIENT)
Dept: URGENT CARE | Facility: CLINIC | Age: 77
End: 2022-06-12
Payer: MEDICARE

## 2022-06-12 VITALS
BODY MASS INDEX: 20.07 KG/M2 | DIASTOLIC BLOOD PRESSURE: 60 MMHG | WEIGHT: 148 LBS | SYSTOLIC BLOOD PRESSURE: 111 MMHG | RESPIRATION RATE: 16 BRPM | HEART RATE: 64 BPM | TEMPERATURE: 101 F | OXYGEN SATURATION: 96 %

## 2022-06-12 DIAGNOSIS — R50.9 FEVER, UNSPECIFIED FEVER CAUSE: ICD-10-CM

## 2022-06-12 DIAGNOSIS — Z11.59 SCREENING FOR VIRAL DISEASE: Primary | ICD-10-CM

## 2022-06-12 LAB
CTP QC/QA: YES
CTP QC/QA: YES
POC MOLECULAR INFLUENZA A AGN: NEGATIVE
POC MOLECULAR INFLUENZA B AGN: NEGATIVE
SARS-COV-2 RDRP RESP QL NAA+PROBE: NEGATIVE

## 2022-06-12 PROCEDURE — 87502 POCT INFLUENZA A/B MOLECULAR: ICD-10-PCS | Mod: QW,S$GLB,, | Performed by: PHYSICIAN ASSISTANT

## 2022-06-12 PROCEDURE — U0002 COVID-19 LAB TEST NON-CDC: HCPCS | Mod: QW,CR,S$GLB, | Performed by: PHYSICIAN ASSISTANT

## 2022-06-12 PROCEDURE — 99215 PR OFFICE/OUTPT VISIT, EST, LEVL V, 40-54 MIN: ICD-10-PCS | Mod: S$GLB,,, | Performed by: PHYSICIAN ASSISTANT

## 2022-06-12 PROCEDURE — 99215 OFFICE O/P EST HI 40 MIN: CPT | Mod: S$GLB,,, | Performed by: PHYSICIAN ASSISTANT

## 2022-06-12 PROCEDURE — 87502 INFLUENZA DNA AMP PROBE: CPT | Mod: QW,S$GLB,, | Performed by: PHYSICIAN ASSISTANT

## 2022-06-12 PROCEDURE — U0002: ICD-10-PCS | Mod: QW,CR,S$GLB, | Performed by: PHYSICIAN ASSISTANT

## 2022-06-12 RX ORDER — DIPHENHYDRAMINE HCL 25 MG
25 CAPSULE ORAL EVERY 6 HOURS PRN
COMMUNITY
End: 2022-07-26

## 2022-06-12 NOTE — PATIENT INSTRUCTIONS
REPEAT COVID TEST TOMORROW AND THE NEXT DAY TO CONFIRM YOUR COVID NEGATIVE  IF YOU CONVERT TO COVID POSITIVE RETURN TO CLINIC SO WE CAN SET YOU UP FOR THE MONOCLONAL ANTIBODY INFUSION

## 2022-06-12 NOTE — PROGRESS NOTES
Subjective:       Patient ID: Tacho Guerrier Jr. is a 76 y.o. male.    Vitals:  weight is 67.1 kg (148 lb). His tympanic temperature is 101 °F (38.3 °C) (abnormal). His blood pressure is 111/60 and his pulse is 64. His respiration is 16 and oxygen saturation is 96%.     Chief Complaint: Fever    Patient reports fever, body aches,nausea,leg weakness, and right arm pain    Fever   This is a new problem. The current episode started today. The problem has been gradually worsening. The maximum temperature noted was 101 to 101.9 F. Associated symptoms include chest pain, muscle aches and nausea. Pertinent negatives include no congestion, coughing, diarrhea, ear pain, headaches, sore throat or vomiting. Associated symptoms comments: Skin tenderness,light pain,body aches,nausea,frequency in stool,leg weakness. He has tried acetaminophen for the symptoms. The treatment provided mild relief.       Constitution: Positive for fever.   HENT: Negative for ear pain, congestion and sore throat.    Cardiovascular: Positive for chest pain.   Respiratory: Negative for cough.    Gastrointestinal: Positive for nausea. Negative for vomiting and diarrhea.   Skin: Negative for erythema.   Neurological: Negative for headaches.       Objective:      Physical Exam   Constitutional: He is oriented to person, place, and time. He appears well-developed. He is cooperative.  Non-toxic appearance. He does not appear ill. No distress.   HENT:   Head: Normocephalic and atraumatic.   Ears:   Right Ear: Hearing, tympanic membrane, external ear and ear canal normal.   Left Ear: Hearing, tympanic membrane, external ear and ear canal normal.   Nose: Nose normal. No mucosal edema, rhinorrhea or nasal deformity. No epistaxis. Right sinus exhibits no maxillary sinus tenderness and no frontal sinus tenderness. Left sinus exhibits no maxillary sinus tenderness and no frontal sinus tenderness.   Mouth/Throat: Uvula is midline, oropharynx is clear and moist and  mucous membranes are normal. No trismus in the jaw. Normal dentition. No uvula swelling. No oropharyngeal exudate, posterior oropharyngeal edema or posterior oropharyngeal erythema.   Eyes: Conjunctivae, EOM and lids are normal. Pupils are equal, round, and reactive to light. Right eye exhibits no discharge. Left eye exhibits no discharge.   Neck: Trachea normal and phonation normal. Neck supple. No JVD present. No tracheal deviation present. No thyromegaly present. No edema present. No erythema present. No neck rigidity present.   Cardiovascular: Normal rate, regular rhythm, normal heart sounds and normal pulses.   No murmur heard.Exam reveals no gallop and no friction rub.   Pulmonary/Chest: Effort normal and breath sounds normal. No stridor. No respiratory distress. He has no decreased breath sounds. He has no wheezes. He has no rhonchi. He has no rales. He exhibits no tenderness.   Abdominal: Normal appearance. He exhibits no distension. Soft. There is no abdominal tenderness. There is no rebound and no guarding.   Musculoskeletal: Normal range of motion.         General: No deformity. Normal range of motion.   Neurological: He is alert and oriented to person, place, and time. He exhibits normal muscle tone. Coordination normal.   Skin: Skin is warm, dry, intact, not diaphoretic, not pale and no rash. Capillary refill takes less than 2 seconds. No erythema   Psychiatric: His speech is normal and behavior is normal. Judgment and thought content normal.   Nursing note and vitals reviewed.    Results for orders placed or performed in visit on 06/12/22   POCT COVID-19 Rapid Screening   Result Value Ref Range    POC Rapid COVID Negative Negative     Acceptable Yes    POCT Influenza A/B MOLECULAR   Result Value Ref Range    POC Molecular Influenza A Ag Negative Negative, Not Reported    POC Molecular Influenza B Ag Negative Negative, Not Reported     Acceptable Yes           Assessment:        1. Screening for viral disease    2. Fever, unspecified fever cause          Plan:         Screening for viral disease  -     POCT COVID-19 Rapid Screening    Fever, unspecified fever cause  -     POCT Influenza A/B MOLECULAR    Follow up if symptoms worsen or fail to improve, for F/U with PCP or ED.   Patient Instructions   REPEAT COVID TEST TOMORROW AND THE NEXT DAY TO CONFIRM YOUR COVID NEGATIVE  IF YOU CONVERT TO COVID POSITIVE RETURN TO CLINIC SO WE CAN SET YOU UP FOR THE MONOCLONAL ANTIBODY INFUSION

## 2022-07-26 ENCOUNTER — OFFICE VISIT (OUTPATIENT)
Dept: NEUROLOGY | Facility: CLINIC | Age: 77
End: 2022-07-26
Payer: MEDICARE

## 2022-07-26 VITALS
DIASTOLIC BLOOD PRESSURE: 80 MMHG | SYSTOLIC BLOOD PRESSURE: 112 MMHG | BODY MASS INDEX: 21.2 KG/M2 | RESPIRATION RATE: 12 BRPM | WEIGHT: 156.5 LBS | HEART RATE: 54 BPM | HEIGHT: 72 IN

## 2022-07-26 DIAGNOSIS — I10 PRIMARY HYPERTENSION: Chronic | ICD-10-CM

## 2022-07-26 DIAGNOSIS — I48.91 ATRIAL FIBRILLATION, UNSPECIFIED TYPE: ICD-10-CM

## 2022-07-26 DIAGNOSIS — M79.2 NEUROPATHIC PAIN: ICD-10-CM

## 2022-07-26 DIAGNOSIS — B20 HIV INFECTION, UNSPECIFIED SYMPTOM STATUS: Chronic | ICD-10-CM

## 2022-07-26 DIAGNOSIS — G62.9 NEUROPATHY: ICD-10-CM

## 2022-07-26 DIAGNOSIS — E78.2 MIXED HYPERLIPIDEMIA: Chronic | ICD-10-CM

## 2022-07-26 DIAGNOSIS — G25.0 ESSENTIAL TREMOR: Primary | Chronic | ICD-10-CM

## 2022-07-26 PROCEDURE — 99999 PR STA SHADOW: CPT | Mod: PBBFAC,,, | Performed by: NURSE PRACTITIONER

## 2022-07-26 PROCEDURE — 99214 OFFICE O/P EST MOD 30 MIN: CPT | Mod: PBBFAC | Performed by: NURSE PRACTITIONER

## 2022-07-26 PROCEDURE — 99999 PR PBB SHADOW E&M-EST. PATIENT-LVL IV: CPT | Mod: PBBFAC,,, | Performed by: NURSE PRACTITIONER

## 2022-07-26 PROCEDURE — 99214 OFFICE O/P EST MOD 30 MIN: CPT | Mod: S$PBB | Performed by: NURSE PRACTITIONER

## 2022-07-26 PROCEDURE — 99999 PR STA SHADOW: ICD-10-PCS | Mod: PBBFAC,,, | Performed by: NURSE PRACTITIONER

## 2022-07-26 RX ORDER — PROPRANOLOL HYDROCHLORIDE 60 MG/1
60 CAPSULE, EXTENDED RELEASE ORAL DAILY
Qty: 90 CAPSULE | Refills: 1 | Status: SHIPPED | OUTPATIENT
Start: 2022-07-26 | End: 2023-01-26 | Stop reason: SDUPTHER

## 2022-07-26 RX ORDER — GABAPENTIN 300 MG/1
600 CAPSULE ORAL 3 TIMES DAILY
Qty: 540 CAPSULE | Refills: 1 | Status: SHIPPED | OUTPATIENT
Start: 2022-07-26 | End: 2023-01-26 | Stop reason: SDUPTHER

## 2022-07-26 NOTE — PROGRESS NOTES
HPI: Tacho Guerrier Jr. is a 76 y.o. male with many years of benign essential tremor. He has a history of HIV, diagnosed in the 1990's. He also has depression, HTN, HLD, insomnia, and A-fib and history of SVT.     He presents today for a follow up visit. Gabapentin continues to control his tremors well. He had temporarily worsening of his hand tremors when he had a URI a few weeks ago. He tested negative for Covid.     He also continues on Propranolol for tremor. HR 54 today, but he denies dizziness or fatigue.     Tremors are now tolerable. Gabapentin was also helpful for his neuropathy complaints.     One fall, due to mechanical issues. No injuries aside from abrasions.     No other complaints today.     Review of Systems   Constitutional: Negative for malaise/fatigue.   Eyes: Negative for double vision.   Cardiovascular: Negative for chest pain.   Skin: Negative for rash.   Neurological: Positive for tremors and sensory change. Negative for tingling, focal weakness and weakness.   Psychiatric/Behavioral: Negative for depression and memory loss. The patient is not nervous/anxious.        I have reviewed all of this patient's past medical and surgical histories as well as family and social histories and active allergies and medications as documented in the electronic medical record.    Exam:  Gen Appearance, well developed/nourished in no apparent distress  CV: 2+ distal pulses with no edema or swelling  Neuro:  MS: Awake, alert, oriented to place, person, time, situation. Sustains attention. Recent/remote memory intact, Language is full to spontaneous speech/repetition/naming/comprehension. Fund of Knowledge is full  CN: Optic discs are flat with normal vasculature, PERRL, Extraoccular movements and visual fields are full. Normal facial sensation and strength, Hearing symmetric, Tongue and Palate are midline and strong. Shoulder Shrug symmetric and strong.  Motor: Normal bulk, tone, no abnormal movements at rest;  mild tremor of outstretched hands; there is no head tremor noted today. 5/5 strength bilateral upper/lower extremities with 1+ reflexes and no clonus  Sensory: symmetric to light touch, pain, temp, and vibration Romberg negative  Cerebellar: Finger-nose,Heal-shin, Rapid alternating movements intact  Gait: Normal stance, no ataxia    Specialized movement exam  + hypophonic speech.    No facial masking.   No cogwheel rigidity.     No bradykinesia.   R>L tremor with posture - very mild - and no rest tremor now.   No other dystonia, chorea, athetosis, myoclonus, or tics.   No motor impersistence.   Normal-based gait.   No shortened stride length.   No abnormal arm swing.     No postural instability.      Labs:   TSH this year is normal. CMP normal 7/2017    Assessment/Plan: Tacho Guerrier Jr. is a 76 y.o. male with many years of benign essential tremor.    I recommend:   1. Continue Propranolol 60 mg for his essential tremor. Cannot increase further, given his bradycardia (HR in 50's today), which he is tolerating well.   -He did see Dr. Martinez for an opinion, who restarted his Propranolol, and titrated his dose upward, which was more effective for his tremor. No response with 10 mg bid prior.     2. Continue Gabapentin 600 mg tid for hand tremors and neuropathy.   -He tolerates the capsules much better than the tablets, as he experienced excess fatigue with tablet form of Gabapentin. Changed tablets back to capsules.     3. Hand tremor predated Wellbutrin use and did not improve with Wellbutrin wean prior, which caused worsening anxiety/depression.   -he is no longer taking Wellbutrin. Mood euthymic off of Wellbutrin.     4. Primidone has been avoided, given his HAART use. It is likely that other agents, such as Topamax would also have an interaction with his HAART.     5. Note his HIV status currently with low or undetectable viral loads: Tremor unrelated and instead related to family history.     6. He takes Eliquis  and has a history of A-fib.      FU 6 months

## 2023-01-26 ENCOUNTER — OFFICE VISIT (OUTPATIENT)
Dept: NEUROLOGY | Facility: CLINIC | Age: 78
End: 2023-01-26
Payer: MEDICARE

## 2023-01-26 VITALS
SYSTOLIC BLOOD PRESSURE: 118 MMHG | RESPIRATION RATE: 14 BRPM | HEIGHT: 72 IN | HEART RATE: 54 BPM | WEIGHT: 154.13 LBS | BODY MASS INDEX: 20.88 KG/M2 | DIASTOLIC BLOOD PRESSURE: 60 MMHG

## 2023-01-26 DIAGNOSIS — E78.2 MIXED HYPERLIPIDEMIA: Chronic | ICD-10-CM

## 2023-01-26 DIAGNOSIS — I10 PRIMARY HYPERTENSION: Chronic | ICD-10-CM

## 2023-01-26 DIAGNOSIS — G62.9 NEUROPATHY: ICD-10-CM

## 2023-01-26 DIAGNOSIS — G25.0 ESSENTIAL TREMOR: Primary | Chronic | ICD-10-CM

## 2023-01-26 DIAGNOSIS — B20 HIV INFECTION, UNSPECIFIED SYMPTOM STATUS: Chronic | ICD-10-CM

## 2023-01-26 DIAGNOSIS — M79.2 NEUROPATHIC PAIN: ICD-10-CM

## 2023-01-26 DIAGNOSIS — I48.91 ATRIAL FIBRILLATION, UNSPECIFIED TYPE: ICD-10-CM

## 2023-01-26 PROCEDURE — 99214 OFFICE O/P EST MOD 30 MIN: CPT | Mod: PBBFAC | Performed by: NURSE PRACTITIONER

## 2023-01-26 PROCEDURE — 99999 PR STA SHADOW: CPT | Mod: PBBFAC,,, | Performed by: NURSE PRACTITIONER

## 2023-01-26 PROCEDURE — 99214 OFFICE O/P EST MOD 30 MIN: CPT | Mod: S$PBB | Performed by: NURSE PRACTITIONER

## 2023-01-26 PROCEDURE — 99999 PR STA SHADOW: ICD-10-PCS | Mod: PBBFAC,,, | Performed by: NURSE PRACTITIONER

## 2023-01-26 PROCEDURE — 99999 PR PBB SHADOW E&M-EST. PATIENT-LVL IV: CPT | Mod: PBBFAC,,, | Performed by: NURSE PRACTITIONER

## 2023-01-26 RX ORDER — GABAPENTIN 300 MG/1
600 CAPSULE ORAL 3 TIMES DAILY
Qty: 540 CAPSULE | Refills: 1 | Status: SHIPPED | OUTPATIENT
Start: 2023-01-26 | End: 2023-07-27 | Stop reason: SDUPTHER

## 2023-01-26 RX ORDER — PROPRANOLOL HYDROCHLORIDE 60 MG/1
60 CAPSULE, EXTENDED RELEASE ORAL DAILY
Qty: 90 CAPSULE | Refills: 1 | Status: SHIPPED | OUTPATIENT
Start: 2023-01-26 | End: 2023-05-30

## 2023-01-26 NOTE — PROGRESS NOTES
HPI: Tacho Guerrier Jr. is a 77 y.o. male with many years of benign essential tremor. He has a history of HIV, diagnosed in the 1990's. He also has depression, HTN, HLD, insomnia, and A-fib and history of SVT.     He presents today for a follow up visit. Gabapentin continues to control his tremors well.     He also continues on Propranolol for tremor. HR 54 today, but he denies dizziness or fatigue.     Gabapentin was also helpful for his neuropathy complaints.     No falls since his last visit.     No other complaints today.     Review of Systems   Constitutional:  Negative for malaise/fatigue.   Eyes:  Negative for double vision.   Cardiovascular:  Negative for chest pain.   Skin:  Negative for rash.   Neurological:  Positive for tremors and sensory change. Negative for tingling, focal weakness and weakness.   Psychiatric/Behavioral:  Negative for depression and memory loss. The patient is not nervous/anxious.      I have reviewed all of this patient's past medical and surgical histories as well as family and social histories and active allergies and medications as documented in the electronic medical record.    Exam:  Gen Appearance, well developed/nourished in no apparent distress  CV: 2+ distal pulses with no edema or swelling  Neuro:  MS: Awake, alert, oriented to place, person, time, situation. Sustains attention. Recent/remote memory intact, Language is full to spontaneous speech/repetition/naming/comprehension. Fund of Knowledge is full  CN: Optic discs not visualized today, PERRL, Extraoccular movements and visual fields are full. Normal facial sensation and strength, Hearing symmetric, Tongue and Palate are midline and strong. Shoulder Shrug symmetric and strong.  Motor: Normal bulk, tone, no abnormal movements at rest; mild tremor of outstretched hands; there is no head tremor noted today. 5/5 strength bilateral upper/lower extremities with 1+ reflexes and no clonus  Sensory: symmetric to light touch,  pain, temp, and vibration Romberg negative  Cerebellar: Finger-nose,Heal-shin, Rapid alternating movements intact  Gait: Normal stance, no ataxia    Specialized movement exam  + hypophonic speech.    No facial masking.   No cogwheel rigidity.     No bradykinesia.   R>L tremor with posture - very mild - and no rest tremor now.   No other dystonia, chorea, athetosis, myoclonus, or tics.   No motor impersistence.   Normal-based gait.   No shortened stride length.   No abnormal arm swing.     No postural instability.      Labs:   TSH this year is normal. CMP normal 7/2017    Assessment/Plan: Tacho Guerrier Jr. is a 77 y.o. male with many years of benign essential tremor.    I recommend:   1. Continue Propranolol 60 mg for his essential tremor. Cannot increase further, given his bradycardia (HR in 50's today), which he is tolerating well.   -He did see Dr. Martinez for an opinion, who restarted his Propranolol, and titrated his dose upward, which was more effective for his tremor. No response with 10 mg bid prior.     2. Continue Gabapentin 600 mg tid for hand tremors and neuropathy.   -He tolerates the capsules much better than the tablets, as he experienced excess fatigue with tablet form of Gabapentin. Changed tablets back to capsules.     3. Hand tremor predated Wellbutrin use and did not improve with Wellbutrin wean prior, which caused worsening anxiety/depression.   -he is no longer taking Wellbutrin. Mood euthymic off of Wellbutrin.     4. Primidone has been avoided, given his HAART use. It is likely that other agents, such as Topamax would also have an interaction with his HAART.     5. Note his HIV status currently with low or undetectable viral loads: Tremor unrelated and instead related to family history.     6. He takes Eliquis and has a history of A-fib.      FU 6 months

## 2023-05-29 DIAGNOSIS — G25.0 ESSENTIAL TREMOR: Chronic | ICD-10-CM

## 2023-05-29 NOTE — TELEPHONE ENCOUNTER
"Per last chart note on 1/26/2023:    "1. Continue Propranolol 60 mg for his essential tremor. Cannot increase further, given his bradycardia (HR in 50's today), which he is tolerating well. "  "

## 2023-05-30 RX ORDER — PROPRANOLOL HYDROCHLORIDE 60 MG/1
CAPSULE, EXTENDED RELEASE ORAL
Qty: 90 CAPSULE | Refills: 2 | OUTPATIENT
Start: 2023-05-30 | End: 2023-07-07

## 2023-07-07 PROBLEM — R00.1 BRADYCARDIA: Status: ACTIVE | Noted: 2023-07-07

## 2023-07-07 PROBLEM — R00.1 BRADYCARDIA: Status: RESOLVED | Noted: 2023-07-07 | Resolved: 2023-07-07

## 2023-07-07 PROBLEM — R07.89 SENSATION OF CHEST PRESSURE: Status: ACTIVE | Noted: 2023-07-07

## 2023-07-15 ENCOUNTER — NURSE TRIAGE (OUTPATIENT)
Dept: ADMINISTRATIVE | Facility: CLINIC | Age: 78
End: 2023-07-15
Payer: MEDICARE

## 2023-07-15 NOTE — TELEPHONE ENCOUNTER
Reason for Disposition   MORE THAN A DOUBLE DOSE of a prescription or over-the-counter (OTC) drug    Additional Information   Negative: Drug overdose and triager unable to answer question   Negative: Caller requesting a renewal or refill of a medicine patient is currently taking   Negative: Caller requesting information unrelated to medicine   Negative: Caller requesting information about COVID-19 Vaccine   Negative: Caller requesting information about Emergency Contraception   Negative: Caller requesting information about Combined Birth Control Pills   Negative: Caller requesting information about Progestin Birth Control Pills   Negative: Caller requesting information about Post-Op pain or medicines   Negative: Caller requesting a prescription antibiotic (such as Penicillin) for Strep throat and has a positive culture result   Negative: Caller requesting a prescription anti-viral med (such as Tamiflu) and has influenza (flu) symptoms   Negative: Immunization reaction suspected   Negative: Rash while taking a medicine or within 3 days of stopping it   Negative: [1] Asthma and [2] having symptoms of asthma (cough, wheezing, etc.)   Negative: [1] Symptom of illness (e.g., headache, abdominal pain, earache, vomiting) AND [2] more than mild   Negative: Breastfeeding questions about mother's medicines and diet    Protocols used: Medication Question Call-A-  Pt states he took a double dose of his cholesterol medication. Advised per Triage protocol to call poison control and provided with the number. Instructed to call OOC back if symptoms develop. Pt verbalized understanding.

## 2023-07-27 ENCOUNTER — OFFICE VISIT (OUTPATIENT)
Dept: NEUROLOGY | Facility: CLINIC | Age: 78
End: 2023-07-27
Payer: MEDICARE

## 2023-07-27 VITALS
HEIGHT: 72 IN | HEART RATE: 65 BPM | WEIGHT: 153.25 LBS | BODY MASS INDEX: 20.76 KG/M2 | OXYGEN SATURATION: 95 % | RESPIRATION RATE: 12 BRPM | SYSTOLIC BLOOD PRESSURE: 122 MMHG | DIASTOLIC BLOOD PRESSURE: 60 MMHG

## 2023-07-27 DIAGNOSIS — M79.2 NEUROPATHIC PAIN: ICD-10-CM

## 2023-07-27 DIAGNOSIS — I10 PRIMARY HYPERTENSION: Chronic | ICD-10-CM

## 2023-07-27 DIAGNOSIS — E78.2 MIXED HYPERLIPIDEMIA: Chronic | ICD-10-CM

## 2023-07-27 DIAGNOSIS — G62.9 NEUROPATHY: ICD-10-CM

## 2023-07-27 DIAGNOSIS — B20 HIV INFECTION, UNSPECIFIED SYMPTOM STATUS: Chronic | ICD-10-CM

## 2023-07-27 DIAGNOSIS — I48.91 ATRIAL FIBRILLATION, UNSPECIFIED TYPE: ICD-10-CM

## 2023-07-27 DIAGNOSIS — G25.0 ESSENTIAL TREMOR: Primary | Chronic | ICD-10-CM

## 2023-07-27 PROCEDURE — 99999 PR PBB SHADOW E&M-EST. PATIENT-LVL V: ICD-10-PCS | Mod: PBBFAC,,, | Performed by: NURSE PRACTITIONER

## 2023-07-27 PROCEDURE — 99999 PR STA SHADOW: CPT | Mod: PBBFAC,,, | Performed by: NURSE PRACTITIONER

## 2023-07-27 PROCEDURE — 99214 OFFICE O/P EST MOD 30 MIN: CPT | Mod: S$PBB | Performed by: NURSE PRACTITIONER

## 2023-07-27 PROCEDURE — 99215 OFFICE O/P EST HI 40 MIN: CPT | Mod: PBBFAC | Performed by: NURSE PRACTITIONER

## 2023-07-27 PROCEDURE — 99999 PR PBB SHADOW E&M-EST. PATIENT-LVL V: CPT | Mod: PBBFAC,,, | Performed by: NURSE PRACTITIONER

## 2023-07-27 RX ORDER — GABAPENTIN 300 MG/1
600 CAPSULE ORAL 3 TIMES DAILY
Qty: 540 CAPSULE | Refills: 1 | Status: SHIPPED | OUTPATIENT
Start: 2023-07-27

## 2023-07-27 NOTE — PROGRESS NOTES
HPI: Tacho Guerrier Jr. is a 77 y.o. male with many years of benign essential tremor. He has a history of HIV, diagnosed in the 1990's. He also has depression, HTN, HLD, insomnia, and A-fib and history of SVT.     He presents today for a follow up visit. He was seen in the ER on 7/6/23 for SOB and fatigue. HR was 41. He was taken off of Propranolol. Tremors are worse, most noted with actions-writing and eating. He also continues on Gabapentin.     HR 65 off of Propranolol.     Gabapentin was also helpful for his neuropathy complaints.     No falls since his last visit.     No other complaints today.     Review of Systems   Constitutional:  Negative for malaise/fatigue.   Eyes:  Negative for double vision.   Cardiovascular:  Negative for chest pain.   Skin:  Negative for rash.   Neurological:  Positive for tremors and sensory change. Negative for tingling, focal weakness and weakness.   Psychiatric/Behavioral:  Negative for depression and memory loss. The patient is not nervous/anxious.      I have reviewed all of this patient's past medical and surgical histories as well as family and social histories and active allergies and medications as documented in the electronic medical record.    Exam:  Gen Appearance, well developed/nourished in no apparent distress  CV: 2+ distal pulses with no edema or swelling  Neuro:  MS: Awake, alert, oriented to place, person, time, situation. Sustains attention. Recent/remote memory intact, Language is full to spontaneous speech/repetition/naming/comprehension. Fund of Knowledge is full  CN: PERRL, Extraoccular movements and visual fields are full. Normal facial sensation and strength, Hearing symmetric, Tongue and Palate are midline and strong. Shoulder Shrug symmetric and strong.  Motor: Normal bulk, tone, no abnormal movements at rest; mild tremor of outstretched hands; there is no head tremor noted today. 5/5 strength bilateral upper/lower extremities with 1+ reflexes and no  clonus  Sensory: symmetric to light touch, pain, temp, and vibration Romberg negative  Cerebellar: Finger-nose,Heal-shin, Rapid alternating movements intact  Gait: Normal stance, no ataxia    Specialized movement exam  + hypophonic speech.    No facial masking.   No cogwheel rigidity.     No bradykinesia.   R>L tremor with posture - very mild - and no rest tremor now.   No other dystonia, chorea, athetosis, myoclonus, or tics.   No motor impersistence.   Normal-based gait.   No shortened stride length.   No abnormal arm swing.     No postural instability.      Labs:   TSH this year is normal. CMP normal 7/2017    Assessment/Plan: Tacho Guerrier Jr. is a 77 y.o. male with many years of benign essential tremor.    I recommend:   1. Refer to Dr. Molina, Movement Disorders Neurology, for essential tremor, given his limited medication options, considering his use of antivirals, which can be effected by some of the options to treat essential tremor.     While Propranolol was effective, he has experienced significant bradycardia with Propranolol 60 mg dosing over time.   -No response with 10 mg bid prior.  He did see Dr. Martinez for an opinion prior, who restarted his Propranolol, and titrated his dose upward, which was more effective for his tremor.     Hand tremor predated Wellbutrin use and did not improve with Wellbutrin wean prior, which caused worsening anxiety/depression.   -he is no longer taking Wellbutrin. Mood euthymic off of Wellbutrin.     Primidone has been avoided, given his HAART use. It is likely that other agents, such as Topamax would also have an interaction with his HAART.     2. Continue Gabapentin 600 mg tid for hand tremors and neuropathy.   -He tolerates the capsules much better than the tablets, as he experienced excess fatigue with tablet form of Gabapentin. Changed tablets back to capsules.     3. Note his HIV status currently with low or undetectable viral loads: Tremor unrelated and instead  related to family history.     4. He takes Eliquis and has a history of A-fib.      FU with Dr. Pema Molina

## 2023-11-15 ENCOUNTER — OFFICE VISIT (OUTPATIENT)
Dept: NEUROLOGY | Facility: CLINIC | Age: 78
End: 2023-11-15
Payer: MEDICARE

## 2023-11-15 VITALS
HEIGHT: 72 IN | RESPIRATION RATE: 16 BRPM | DIASTOLIC BLOOD PRESSURE: 62 MMHG | HEART RATE: 76 BPM | WEIGHT: 150.38 LBS | BODY MASS INDEX: 20.37 KG/M2 | SYSTOLIC BLOOD PRESSURE: 128 MMHG

## 2023-11-15 DIAGNOSIS — G25.0 ESSENTIAL TREMOR: Primary | Chronic | ICD-10-CM

## 2023-11-15 DIAGNOSIS — G20.C PARKINSONISM, UNSPECIFIED PARKINSONISM TYPE: ICD-10-CM

## 2023-11-15 DIAGNOSIS — Z21 ASYMPTOMATIC HIV INFECTION, WITH NO HISTORY OF HIV-RELATED ILLNESS: Chronic | ICD-10-CM

## 2023-11-15 PROCEDURE — 99999 PR PBB SHADOW E&M-EST. PATIENT-LVL V: CPT | Mod: PBBFAC,,, | Performed by: STUDENT IN AN ORGANIZED HEALTH CARE EDUCATION/TRAINING PROGRAM

## 2023-11-15 PROCEDURE — 99999 PR PBB SHADOW E&M-EST. PATIENT-LVL V: ICD-10-PCS | Mod: PBBFAC,,, | Performed by: STUDENT IN AN ORGANIZED HEALTH CARE EDUCATION/TRAINING PROGRAM

## 2023-11-15 PROCEDURE — 99999 PR STA SHADOW: CPT | Mod: PBBFAC,,, | Performed by: STUDENT IN AN ORGANIZED HEALTH CARE EDUCATION/TRAINING PROGRAM

## 2023-11-15 PROCEDURE — 99215 OFFICE O/P EST HI 40 MIN: CPT | Mod: S$PBB | Performed by: STUDENT IN AN ORGANIZED HEALTH CARE EDUCATION/TRAINING PROGRAM

## 2023-11-15 PROCEDURE — 99215 OFFICE O/P EST HI 40 MIN: CPT | Mod: PBBFAC | Performed by: STUDENT IN AN ORGANIZED HEALTH CARE EDUCATION/TRAINING PROGRAM

## 2023-11-15 RX ORDER — CARBIDOPA AND LEVODOPA 25; 100 MG/1; MG/1
1 TABLET ORAL 3 TIMES DAILY
Qty: 270 TABLET | Refills: 3 | Status: SHIPPED | OUTPATIENT
Start: 2023-11-15 | End: 2024-02-15

## 2023-11-15 NOTE — PROGRESS NOTES
Name: Tacho Guerrier Jr.  MRN: [id number]   CSN: [id number]      Date: 11/15/2023    Referring physician:  Mere Dejesus NP  [address] [place] [address]    Chief Complaint / Interval History: Tremor    Tacho Guerrier Jr. is a 77 y.o. male with many years of benign essential tremor. He has a history of HIV, diagnosed in the 1990's. He also has depression, HTN, HLD, insomnia, and A-fib and history of SVT.     History of Present Illness (HPI):    Mr. Guerrier is a 78 yo RH man who presents for evaluation of tremor. His tremors began around age 44 involving his bilateral hands (R>L). He also more recently has occasional right leg and chin tremor. He has voice tremor which is not noticeable to him. His balance is not great but he is not falling. He does not consume caffeine. His tremor does respond to ETOH however he no longer drinks. His grandfather and aunt also had tremors. His handwriting is sloppy. He has not found an oral medication that helps reduce his tremor. He has been unable to tolerate propranolol and gabapentin even at relatively high doses has not helped much.     Current Mvmt Medications:  GBP 600mg TID     Prior Mvmt Medication Trials:  Propranolol - caused symptomatic bradycardia   Primidone - can't use in the setting of eliquis and HAART therapy  TPM - would induce his HAART therapy and make less effective    Nonmotor ROS:  Smell/Taste: no issues  Voice/Swallowing: no changes in voice or swallowing   Gait/Falls: no issues  Exercise: yes, walks regularly   Dizziness: none   Hydration: does well with this   Urinary Issues: none  Constipation: occasional   Sleep/RBD: sleeps for 3-4 hours at a time, no active dreams   Hallucinations/Peripheral Illusions: no issues  Memory/Cognition/Language: doing ok   Mood: doing ok     Past Medical History: The patient  has a past medical history of A-fib, Atrial flutter, Heart murmur, HIV (human immunodeficiency virus infection), Other and unspecified  hyperlipidemia, SVT (supraventricular tachycardia), and Tachycardia. HTN    Relevant Surgical History:   Past Surgical History:   Procedure Laterality Date    COLONOSCOPY  2013    TVA    COLONOSCOPY N/A 5/5/2017    Procedure: COLONOSCOPY;  Surgeon: Mode Rae MD;  Location: [facility];  Service: Endoscopy;  Laterality: N/A;    COLONOSCOPY N/A 8/18/2023    Procedure: COLONOSCOPY;  Surgeon: Carlene Cm MD;  Location: [facility];  Service: Endoscopy;  Laterality: N/A;    PROSTATE SURGERY      TONSILLECTOMY      UPPER GASTROINTESTINAL ENDOSCOPY      in the 70s per pt        Social History: The patient  reports that he has never smoked. He has never used smokeless tobacco. He reports that he does not drink alcohol and does not use drugs.    Family History: Their family history includes Breast cancer in his mother; Cancer in his father; Cirrhosis in his mother; Diabetes in his mother and sister; Heart disease in his mother; Hypertension in his mother; Hypotension in his father; Lung cancer in his father. Family history of tremor     Allergies: Oxybutynin; Sulfa (sulfonamide antibiotics); and Latex, natural rubber     Meds:   Current Outpatient Medications on File Prior to Visit   Medication Sig Dispense Refill    abacavir-lamivudine (EPZICOM) 600-300 mg per tablet TAKE 1 TABLET BY MOUTH EVERY DAY 90 tablet 3    acetaminophen (TYLENOL) 650 MG TbSR Take 650 mg by mouth as needed.      benazepriL (LOTENSIN) 10 MG tablet TAKE 1 TABLET BY MOUTH EVERY DAY IN THE EVENING 90 tablet 3    cholecalciferol, vitamin D3, (VITAMIN D3) 50 mcg (2,000 unit) Tab Take by mouth once daily.      diltiaZEM (CARDIZEM SR) 60 MG Cp12 TAKE 1 CAPSULE BY MOUTH TWICE A  capsule 3    docusate sodium (COLACE ORAL) Take 1 tablet by mouth 2 (two) times daily as needed.      ELIQUIS 2.5 mg Tab TAKE 1 TABLET BY MOUTH TWICE A DAY 60 tablet 5    fluticasone propionate (FLONASE) 50 mcg/actuation nasal spray 1 spray (50 mcg total) by Each  Nostril route once daily. 9.9 mL 0    FOLIC ACID/MULTIVITS-MIN (MEN'S DAILY FORMULA ORAL) Take 1 tablet by mouth once daily.      gabapentin (NEURONTIN) 300 MG capsule Take 2 capsules (600 mg total) by mouth 3 (three) times daily. 540 capsule 1    mirabegron (MYRBETRIQ) 50 mg Tb24 Take 1 tablet (50 mg total) by mouth once daily. 90 tablet 3    pantoprazole (PROTONIX) 40 MG tablet TAKE 1 TABLET BY MOUTH EVERY DAY 90 tablet 3    PREZISTA 800 mg Tab TAKE 1 TABLET BY MOUTH EVERY DAY WITH BREAKFAST 30 tablet 11    ritonavir (NORVIR) 100 mg Tab tablet TAKE 1 TABLET BY MOUTH EVERY DAY 90 tablet 3    rosuvastatin (CRESTOR) 40 MG Tab TAKE 1 TABLET BY MOUTH EVERY DAY IN THE EVENING 90 tablet 3    simethicone (GAS-X ORAL) Take 1 tablet by mouth daily as needed.      traMADoL (ULTRAM) 50 mg tablet Take 1 tablet (50 mg total) by mouth every 6 (six) hours as needed for Pain. 30 tablet 3    VASCEPA 1 gram Cap TAKE 2 CAPSULES BY MOUTH TWICE A  capsule 5    zolpidem (AMBIEN) 10 mg Tab Take 1 tablet (10 mg total) by mouth every evening. 30 tablet 2     No current facility-administered medications on file prior to visit.       Exam:  /62 (BP Location: Right arm, Patient Position: Sitting, BP Method: Medium (Manual))   Pulse 76   Resp 16   Ht 6' (1.829 m)   Wt 68.2 kg (150 lb 5.7 oz)   BMI 20.39 kg/m²     Constitutional  Well-developed, well-nourished, appears stated age   Cardiovascular  No LE edema bilaterally   Neurological    * Mental status  MOCA = not done during today's visit     - Orientation  Oriented to conversation     - Memory   Intact recent and remote     - Attention/concentration  Attentive, vigilant during exam     - Language  Intact to conversation.     - Fund of knowledge  Aware of current events     - Executive  Well-organized thoughts     - Other     * Cranial nerves       - CN II  Pupils equal, visual fields full to confrontation     - CN III, IV, VI  Extraocular movements full, normal pursuits  and saccades     - CN V  Sensation V1 - V3 intact     - CN VII  Face strong and symmetric bilaterally     - CN VIII  Hearing intact bilaterally     - CN IX, X  Palate raises midline and symmetric     - CN XI  SCM and trapezius 5/5 bilaterally     - CN XII  Tongue midline   * Motor  Muscle bulk normal, strength 5/5 throughout   * Sensory   Intact to light touch throughout   * Coordination  No dysmetria with finger-to-nose or heel-to-shin   * Gait  See below.   * Deep tendon reflexes  2+ and symmetric throughout   Babinski downgoing bilaterally   * Specialized movement exam Gen: masked facies and reduced blink   Speech: hypophonic with vocal tremor   Tremor: head tremor and vocal tremor, bilateral rest tremor (R>L) in the hands and legs, action >>> postural tremor bilaterally; rare chin tremor   Bradykinesia: some difficulty on the right but states related to arthritic changes   Tone: normal   Gait: walking looks good, posture slightly stooped, arm swing god, pivots to turn      Medical Record Review:  Labs, imaging and prior notes reviewed independently.       Diagnoses:          1. Essential tremor  Ambulatory referral/consult to Neurology    Ambulatory referral/consult to Physical/Occupational Therapy    CANCELED: Ambulatory referral/consult to Physical/Occupational Therapy      2. Asymptomatic HIV infection, with no history of HIV-related illness        3. Parkinsonism, unspecified Parkinsonism type            Assessment:  Mr. Guerrier is a 76 yo RH man with tremor involving his bilateral hands (R>L) since his 40s. The tremor is most noticeable with action. He also has vocal tremor, head tremor and occasional chin and right foot tremor as well. His balance is poor but he does not shuffle or fall. He has no non-motor symptoms of PD. He does have some very subtle parkinsonian features such as rest tremor (involving R>L side) and possibly some bradykinesia vs interruptions in his hand movements (R>L). His speech is  mildly hypophonic and he does report some drooling at times. We discussed that we are very limited in oral medications to treat the action component of his tremors due to his use of eliquis and HAART and inability to tolerate propranolol. We even briefly touched on lesional therapies keeping in mind his surgical risks in the setting of Eliquis. We have agreed to the following:    Plan:  - Continue Gabapentin 600mg TID. Unable to use Propranolol (symptomatic bradycardia), Primidone (elquis and HAART) or TPM (HAART). We can consider baclofen in the future but worried about sedation.  - Given his very mild parkinsonian features and limitations as stated above we will trial Sinemet. Given a 6 week titration schedule.   - Also discussed assistive devices such as weighted utensils, bracelets and pens.   - I have provided an OT referral for tremor.     RTC in 3-4 months to see me.     Total time: 49 minutes spent on the encounter, which includes face to face time and non-face to face time preparing to see the patient (eg, review of tests), Obtaining and/or reviewing separately obtained history, Documenting clinical information in the electronic or other health record, Independently interpreting results (not separately reported) and communicating results to the patient/family/caregiver, or Care coordination (not separately reported).     Pema Molina MD  Division of Movement and Memory Disorders  [facility]  [phone]

## 2023-11-15 NOTE — PATIENT INSTRUCTIONS
Assistive Devices for Tremor:   - Try getting some weighted utensils. Pick a set with a heavy handle. If this is helpful, consider [employer])ware which can be purchased online.   - Can try a weighted bracelet for tremor.   - Can also try weighted/thick ink pens for tremor.     Occupational Therapy for tremor can be helpful. I will give you a prescription for this.     Sinemet 25/100 Titration  (Carbidopa/Levodopa)                     Breakfast             Lunch   Dinner  Week 1 1/2 tablet 0 0   Week 2 1/2 tablet 1/2 tablet 0   Week 3 1/2 tablet 1/2 tablet 1/2 tablet   Week 4 1 tablet 1/2 tablet 1/2 tablet   Week 5 1 tablet 1 tablet 1/2 tablet   Week 6 1 tablet 1 tablet 1 tablet     Sinemet 25/100 1 tablet by mouth three times daily is the final dosage. If there is any point during this titration that you are satisfied with you symptom control prior to reaching this dose, please let us know.     Check blood pressure if you feel sleepy.     Return to clinic in 3-4 months.

## 2024-02-15 ENCOUNTER — OFFICE VISIT (OUTPATIENT)
Dept: NEUROLOGY | Facility: CLINIC | Age: 79
End: 2024-02-15
Payer: MEDICARE

## 2024-02-15 VITALS
HEART RATE: 76 BPM | RESPIRATION RATE: 16 BRPM | HEIGHT: 72 IN | SYSTOLIC BLOOD PRESSURE: 112 MMHG | BODY MASS INDEX: 19.5 KG/M2 | DIASTOLIC BLOOD PRESSURE: 62 MMHG | WEIGHT: 143.94 LBS

## 2024-02-15 DIAGNOSIS — G62.9 NEUROPATHY: ICD-10-CM

## 2024-02-15 DIAGNOSIS — B20 HIV INFECTION, UNSPECIFIED SYMPTOM STATUS: ICD-10-CM

## 2024-02-15 DIAGNOSIS — G20.C PARKINSONISM, UNSPECIFIED PARKINSONISM TYPE: ICD-10-CM

## 2024-02-15 DIAGNOSIS — G25.0 ESSENTIAL TREMOR: Primary | ICD-10-CM

## 2024-02-15 PROCEDURE — G2211 COMPLEX E/M VISIT ADD ON: HCPCS | Mod: S$GLB,,, | Performed by: STUDENT IN AN ORGANIZED HEALTH CARE EDUCATION/TRAINING PROGRAM

## 2024-02-15 PROCEDURE — 99214 OFFICE O/P EST MOD 30 MIN: CPT | Mod: S$GLB,,, | Performed by: STUDENT IN AN ORGANIZED HEALTH CARE EDUCATION/TRAINING PROGRAM

## 2024-02-15 PROCEDURE — 99999 PR PBB SHADOW E&M-EST. PATIENT-LVL IV: CPT | Mod: PBBFAC,,, | Performed by: STUDENT IN AN ORGANIZED HEALTH CARE EDUCATION/TRAINING PROGRAM

## 2024-02-15 RX ORDER — CARBIDOPA AND LEVODOPA 25; 100 MG/1; MG/1
1 TABLET, EXTENDED RELEASE ORAL 2 TIMES DAILY
Qty: 60 TABLET | Refills: 0 | Status: SHIPPED | OUTPATIENT
Start: 2024-02-15 | End: 2024-03-09

## 2024-02-15 NOTE — PATIENT INSTRUCTIONS
1) First,go down to 300mg (1 capsule) of Gabapentin three times/day. Check blood pressure once you're on the 300mg three times/day. If top number is >110 then start taking Sinemet controlled release 1 tab twice daily.     If you feel dizzy or sleepy - check your blood pressure. If you feel that your tremor is MORE bothersome. Stop the Sinemet and increase the gabapentin back to 600mg three times/day and let me know.

## 2024-02-15 NOTE — PROGRESS NOTES
Name: Tacho Guerrier Jr.  MRN: [id number]   CSN: [id number]      Date: 02/15/2024    Referring physician:  No referring provider defined for this encounter.    Chief Complaint / Interval History: Tremor    Tacho Guerrier Jr. is a 77 y.o. male with many years of benign essential tremor. He has a history of HIV, diagnosed in the 1990's. He also has depression, HTN, HLD, insomnia, and A-fib and history of SVT.     History of Present Illness (HPI):    Mr. Guerrier is a 78 yo RH man who presents for evaluation of tremor. His tremors began around age 44 involving his bilateral hands (R>L). He also more recently has occasional right leg and chin tremor. He has voice tremor which is not noticeable to him. His balance is not great but he is not falling. He does not consume caffeine. His tremor does respond to ETOH however he no longer drinks. His grandfather and aunt also had tremors. His handwriting is sloppy. He has not found an oral medication that helps reduce his tremor. He has been unable to tolerate propranolol and gabapentin even at relatively high doses has not helped much.     Interval History:  Mr. Guerrier presents for follow-up. He felt the Sinemet was helpful but could not tolerate due to low blood pressure. Felt tired and his SBP was in the 70s.   Fell not long after our last visit. Tripped. No further falls.    Tremor is about the same. Chin tremor more pronounced. Speech a bit more quiet.   Wondering if he could try a lower dose of Sinemet and/or reduce GBP so that he can tolerate it better.   OT was not overly helpful for tremor but did help him to get some weighted utensils which is sometimes useful.    Current Mvmt Medications:  GBP 600mg TID     Prior Mvmt Medication Trials:  Propranolol - caused symptomatic bradycardia   Primidone - can't use in the setting of eliquis and HAART therapy  TPM - would induce his HAART therapy and make less effective    Nonmotor ROS:  Smell/Taste: no issues  Voice/Swallowing: no  changes in voice or swallowing   Gait/Falls: no issues  Exercise: yes, walks regularly   Dizziness: none   Hydration: does well with this   Urinary Issues: none  Constipation: occasional - using fiber gummies   Sleep/RBD: sleeps for 3-4 hours at a time, no active dreams   Hallucinations/Peripheral Illusions: no issues  Memory/Cognition/Language: doing ok   Mood: doing ok     Past Medical History: The patient  has a past medical history of A-fib, Atrial flutter, Heart murmur, HIV (human immunodeficiency virus infection), Other and unspecified hyperlipidemia, SVT (supraventricular tachycardia), and Tachycardia. HTN    Relevant Surgical History:   Past Surgical History:   Procedure Laterality Date    COLONOSCOPY  2013    TVA    COLONOSCOPY N/A 5/5/2017    Procedure: COLONOSCOPY;  Surgeon: Mode Rae MD;  Location: [facility] ENDO;  Service: Endoscopy;  Laterality: N/A;    COLONOSCOPY N/A 8/18/2023    Procedure: COLONOSCOPY;  Surgeon: Carlene Cm MD;  Location: [facility] ENDO;  Service: Endoscopy;  Laterality: N/A;    PROSTATE SURGERY      TONSILLECTOMY      UPPER GASTROINTESTINAL ENDOSCOPY      in the 70s per pt        Social History: The patient  reports that he has never smoked. He has never used smokeless tobacco. He reports that he does not drink alcohol and does not use drugs.    Family History: Their family history includes Breast cancer in his mother; Cancer in his father; Cirrhosis in his mother; Diabetes in his mother and sister; Heart disease in his mother; Hypertension in his mother; Hypotension in his father; Lung cancer in his father. Family history of tremor     Allergies: Oxybutynin; Sulfa (sulfonamide antibiotics); and Latex, natural rubber     Meds:   Current Outpatient Medications on File Prior to Visit   Medication Sig Dispense Refill    abacavir-lamivudine (EPZICOM) 600-300 mg per tablet TAKE 1 TABLET BY MOUTH EVERY DAY 90 tablet 3    acetaminophen (TYLENOL) 650 MG TbSR Take 650 mg by mouth  as needed.      benazepriL (LOTENSIN) 10 MG tablet TAKE 1 TABLET BY MOUTH EVERY DAY IN THE EVENING 90 tablet 3    cholecalciferol, vitamin D3, (VITAMIN D3) 50 mcg (2,000 unit) Tab Take by mouth once daily.      diltiaZEM (CARDIZEM SR) 60 MG Cp12 TAKE 1 CAPSULE BY MOUTH TWICE A  capsule 3    docusate sodium (COLACE ORAL) Take 1 tablet by mouth 2 (two) times daily as needed.      ELIQUIS 2.5 mg Tab TAKE 1 TABLET BY MOUTH TWICE A DAY 60 tablet 5    fluticasone propionate (FLONASE) 50 mcg/actuation nasal spray 1 spray (50 mcg total) by Each Nostril route once daily. 9.9 mL 0    FOLIC ACID/MULTIVITS-MIN (MEN'S DAILY FORMULA ORAL) Take 1 tablet by mouth once daily.      gabapentin (NEURONTIN) 300 MG capsule Take 2 capsules (600 mg total) by mouth 3 (three) times daily. 540 capsule 1    meloxicam (MOBIC) 7.5 MG tablet Take 1 tablet (7.5 mg total) by mouth once daily. 90 tablet 0    mirabegron (MYRBETRIQ) 50 mg Tb24 Take 1 tablet (50 mg total) by mouth once daily. 90 tablet 3    pantoprazole (PROTONIX) 40 MG tablet TAKE 1 TABLET BY MOUTH EVERY DAY 90 tablet 3    PREZISTA 800 mg Tab TAKE 1 TABLET BY MOUTH EVERY DAY WITH BREAKFAST 30 tablet 11    ritonavir (NORVIR) 100 mg Tab tablet TAKE 1 TABLET BY MOUTH EVERY DAY 90 tablet 3    rosuvastatin (CRESTOR) 40 MG Tab TAKE 1 TABLET BY MOUTH EVERY DAY IN THE EVENING 90 tablet 3    simethicone (GAS-X ORAL) Take 1 tablet by mouth daily as needed.      traMADoL (ULTRAM) 50 mg tablet Take 1 tablet (50 mg total) by mouth every 6 (six) hours as needed for Pain. 30 tablet 3    VASCEPA 1 gram Cap TAKE 2 CAPSULES BY MOUTH TWICE A  capsule 5    zolpidem (AMBIEN) 10 mg Tab TAKE 1 TABLET BY MOUTH EVERY DAY IN THE EVENING 30 tablet 2    [DISCONTINUED] carbidopa-levodopa  mg (SINEMET)  mg per tablet Take 1 tablet by mouth 3 (three) times daily. 270 tablet 3     No current facility-administered medications on file prior to visit.       Exam:  /62 (BP Location: Left  arm, Patient Position: Sitting, BP Method: Medium (Manual))   Pulse 76   Resp 16   Ht 6' (1.829 m)   Wt 65.3 kg (143 lb 15.4 oz)   BMI 19.52 kg/m²     Constitutional  Well-developed, well-nourished, appears stated age   Cardiovascular  No LE edema bilaterally   Neurological    * Mental status  MOCA = not done during today's visit     - Orientation  Oriented to conversation     - Memory   Intact recent and remote     - Attention/concentration  Attentive, vigilant during exam     - Language  Intact to conversation.     - Fund of knowledge  Aware of current events     - Executive  Well-organized thoughts     - Other     * Cranial nerves       - CN II  Pupils equal, visual fields full to confrontation     - CN III, IV, VI  Extraocular movements full, normal pursuits and saccades         - CN VII  Face strong and symmetric bilaterally     - CN VIII  Hearing intact bilaterally         - CN XI  SCM and trapezius 5/5 bilaterally       * Motor  Muscle bulk normal, strength 5/5 throughout   * Sensory   Intact to light touch throughout   * Coordination  No dysmetria with finger-to-nose    * Gait  See below.   * Deep tendon reflexes  2+ and symmetric throughout   Babinski downgoing bilaterally   * Specialized movement exam Gen: masked facies and reduced blink   Speech: hypophonic with vocal tremor   Tremor: head tremor and vocal tremor, bilateral rest tremor (L>R) in the hands and legs, action >>> postural tremor bilaterally; rare chin tremor   Bradykinesia: some difficulty on the left   Tone: normal   Gait: walking looks good, posture slightly stooped, arm swing good, pivots to turn      Medical Record Review:  Labs, imaging and prior notes reviewed independently.       Diagnoses:          1. Essential tremor        2. Parkinsonism, unspecified Parkinsonism type        3. Neuropathy        4. HIV infection, unspecified symptom status              Assessment:  Mr. Guerrier is a 77 yo RH man with tremor involving his  bilateral hands (R>L) since his 40s. The tremor is most noticeable with action. He also has vocal tremor, head tremor and occasional chin and right foot tremor as well. His balance is poor but he does not shuffle or fall. He has no non-motor symptoms of PD. He does have some very subtle parkinsonian features such as rest tremor (involving L>R side) and possibly some bradykinesia vs interruptions in his hand movements (L>R). His speech is hypophonic and he does report some drooling at times. We discussed that we are very limited in oral medications to treat the action component of his tremors due to his use of eliquis and HAART and inability to tolerate propranolol. We even briefly touched on lesional therapies keeping in mind his surgical risks in the setting of Eliquis. We have agreed to the following:    Plan:  - He uses GBP for neuropathy which he states is mild. We discussed that this medication may also be reducing his action/postural tremor. We will attempt to reduce to 300mg TID. I asked him to check BP and if SBP >110 then to start Sinemet 25/100 CR 1 tab BID if tolerated. If blood pressure drops or he feels his tremors worsen I asked him to return back to  TID and stop the Sinemet. Unable to use Propranolol (symptomatic bradycardia), Primidone (elquis and HAART) or TPM (HAART). We can consider baclofen in the future but worried about sedation.  - Using assistive devices such as weighted utensils, bracelets and pens.   - OT completed for tremor was minimally helpful.     RTC in 3 months to see me.     Total time: 49 minutes spent on the encounter, which includes face to face time and non-face to face time preparing to see the patient (eg, review of tests), Obtaining and/or reviewing separately obtained history, Documenting clinical information in the electronic or other health record, Independently interpreting results (not separately reported) and communicating results to the  patient/family/caregiver, or Care coordination (not separately reported).     Pema Molina MD  Division of Movement and Memory Disorders  [facility]  [phone]

## 2024-03-09 RX ORDER — CARBIDOPA AND LEVODOPA 25; 100 MG/1; MG/1
1 TABLET, EXTENDED RELEASE ORAL 2 TIMES DAILY
Qty: 180 TABLET | Refills: 1 | Status: SHIPPED | OUTPATIENT
Start: 2024-03-09 | End: 2024-04-03

## 2024-03-14 ENCOUNTER — TELEPHONE (OUTPATIENT)
Dept: NEUROLOGY | Facility: CLINIC | Age: 79
End: 2024-03-14
Payer: MEDICARE

## 2024-03-14 NOTE — TELEPHONE ENCOUNTER
Patient states that he stopped Sinemet about 1 week ago due to side effects-nausea, insomnia, headache, and dizziness that started after 2 weeks of taking it. He is willing to try something else if you could recommend. Please advise.

## 2024-03-14 NOTE — TELEPHONE ENCOUNTER
----- Message from Radha Arora sent at 3/14/2024  1:58 PM CDT -----  Contact: PATIENT  Tacho Guerrier Jr.  MRN: [id number]  : 1945  PCP: Gene Yusuf Jr.  Home Phone      [phone]  Work Phone      Not on file.  Mobile          [phone]      MESSAGE: Patient wanted to let Dr. Molina know that he stopped the Carbidopa Levodopa because of the side effects.          Phone: [phone]

## 2024-03-25 NOTE — TELEPHONE ENCOUNTER
Patient notified, verbalized understanding. Patient accepts appointment on 4/3/2024 at 3:20pm with .

## 2024-04-03 ENCOUNTER — OFFICE VISIT (OUTPATIENT)
Dept: NEUROLOGY | Facility: CLINIC | Age: 79
End: 2024-04-03
Payer: MEDICARE

## 2024-04-03 VITALS
DIASTOLIC BLOOD PRESSURE: 76 MMHG | HEART RATE: 72 BPM | RESPIRATION RATE: 16 BRPM | SYSTOLIC BLOOD PRESSURE: 126 MMHG | WEIGHT: 144.38 LBS | HEIGHT: 72 IN | BODY MASS INDEX: 19.56 KG/M2

## 2024-04-03 DIAGNOSIS — G62.9 NEUROPATHY: ICD-10-CM

## 2024-04-03 DIAGNOSIS — G25.0 ESSENTIAL TREMOR: Primary | ICD-10-CM

## 2024-04-03 DIAGNOSIS — B20 HIV INFECTION, UNSPECIFIED SYMPTOM STATUS: ICD-10-CM

## 2024-04-03 DIAGNOSIS — G20.C PARKINSONISM, UNSPECIFIED PARKINSONISM TYPE: ICD-10-CM

## 2024-04-03 PROCEDURE — 1159F MED LIST DOCD IN RCRD: CPT | Mod: CPTII,S$GLB,, | Performed by: STUDENT IN AN ORGANIZED HEALTH CARE EDUCATION/TRAINING PROGRAM

## 2024-04-03 PROCEDURE — 3074F SYST BP LT 130 MM HG: CPT | Mod: CPTII,S$GLB,, | Performed by: STUDENT IN AN ORGANIZED HEALTH CARE EDUCATION/TRAINING PROGRAM

## 2024-04-03 PROCEDURE — 1126F AMNT PAIN NOTED NONE PRSNT: CPT | Mod: CPTII,S$GLB,, | Performed by: STUDENT IN AN ORGANIZED HEALTH CARE EDUCATION/TRAINING PROGRAM

## 2024-04-03 PROCEDURE — 99999 PR PBB SHADOW E&M-EST. PATIENT-LVL IV: CPT | Mod: PBBFAC,,, | Performed by: STUDENT IN AN ORGANIZED HEALTH CARE EDUCATION/TRAINING PROGRAM

## 2024-04-03 PROCEDURE — G2211 COMPLEX E/M VISIT ADD ON: HCPCS | Mod: S$GLB,,, | Performed by: STUDENT IN AN ORGANIZED HEALTH CARE EDUCATION/TRAINING PROGRAM

## 2024-04-03 PROCEDURE — 3078F DIAST BP <80 MM HG: CPT | Mod: CPTII,S$GLB,, | Performed by: STUDENT IN AN ORGANIZED HEALTH CARE EDUCATION/TRAINING PROGRAM

## 2024-04-03 PROCEDURE — 99214 OFFICE O/P EST MOD 30 MIN: CPT | Mod: S$GLB,,, | Performed by: STUDENT IN AN ORGANIZED HEALTH CARE EDUCATION/TRAINING PROGRAM

## 2024-04-03 RX ORDER — CARBIDOPA AND LEVODOPA 25; 100 MG/1; MG/1
1 TABLET ORAL 3 TIMES DAILY
Qty: 90 TABLET | Refills: 11 | Status: SHIPPED | OUTPATIENT
Start: 2024-04-03 | End: 2025-04-03

## 2024-04-03 NOTE — PATIENT INSTRUCTIONS
Sinemet 25/100 Titration  (Carbidopa/Levodopa)                     Breakfast             Lunch   Dinner  Week 1 1/2 tablet 0 0   Week 2 1/2 tablet 1/2 tablet 0   Week 3 1/2 tablet 1/2 tablet 1/2 tablet   Week 4 1 tablet 1/2 tablet 1/2 tablet   Week 5 1 tablet 1 tablet 1/2 tablet   Week 6 1 tablet 1 tablet 1 tablet     Sinemet 25/100 1 tablet by mouth three times daily is the final dosage. If there is any point during this titration that you are satisfied with you symptom control prior to reaching this dose, please let us know.     Tips:  - Take your medications around the same time each day.   - Keep a small container of your medication with you at all times.   - Take 1 pill at a time.   - Keep your chin level or slightly tucked when swallowing.

## 2024-04-03 NOTE — PROGRESS NOTES
Name: Tacho Guerrier Jr.  MRN: [id number]   [facility]: [id number]      Date: 04/03/2024      Chief Complaint / Interval History: Tremor    Tacho Guerrier Jr. is a 77 y.o. male with many years of benign essential tremor. He has a history of HIV, diagnosed in the 1990's. He also has depression, HTN, HLD, insomnia, and A-fib and history of SVT.     History of Present Illness (HPI):    Mr. Guerrier is a 78 yo RH man who presents for evaluation of tremor. His tremors began around age 44 involving his bilateral hands (R>L). He also more recently has occasional right leg and chin tremor. He has voice tremor which is not noticeable to him. His balance is not great but he is not falling. He does not consume caffeine. His tremor does respond to ETOH however he no longer drinks. His grandfather and aunt also had tremors. His handwriting is sloppy. He has not found an oral medication that helps reduce his tremor. He has been unable to tolerate propranolol and gabapentin even at relatively high doses has not helped much.     Interval History:  Mr. Guerrier presents for follow-up.   Sinemet CR was tried but wasn't tolerated without major benefit.   He does still feel that there is something that sinemet does for him and is wondering if we could try the IR formulation again with lower dosage of GBP.   No new symptoms to report today.    Current Mvmt Medications:  GBP 600mg TID     Prior Mvmt Medication Trials:  Propranolol - caused symptomatic bradycardia   Primidone - can't use in the setting of eliquis and HAART therapy  TPM - would induce his HAART therapy and make less effective    Nonmotor ROS:  Smell/Taste: no issues  Voice/Swallowing: no changes in voice or swallowing   - sometimes drooling   Gait/Falls: no issues  Exercise: yes, walks regularly   Dizziness: none   Hydration: does well with this   Urinary Issues: none  Constipation: occasional - using fiber gummies   Sleep/RBD: sleeps for 3-4 hours at a time, no active dreams    Hallucinations/Peripheral Illusions: no issues  Memory/Cognition/Language: doing ok   Mood: doing ok     Past Medical History: The patient  has a past medical history of A-fib, Atrial flutter, Heart murmur, HIV (human immunodeficiency virus infection), Other and unspecified hyperlipidemia, SVT (supraventricular tachycardia), and Tachycardia. HTN    Relevant Surgical History:   Past Surgical History:   Procedure Laterality Date    COLONOSCOPY  2013    TVA    COLONOSCOPY N/A 5/5/2017    Procedure: COLONOSCOPY;  Surgeon: Mode Rae MD;  Location: [facility] ENDO;  Service: Endoscopy;  Laterality: N/A;    COLONOSCOPY N/A 8/18/2023    Procedure: COLONOSCOPY;  Surgeon: Carlene Cm MD;  Location: [facility] ENDO;  Service: Endoscopy;  Laterality: N/A;    PROSTATE SURGERY      TONSILLECTOMY      UPPER GASTROINTESTINAL ENDOSCOPY      in the 70s per pt        Social History: The patient  reports that he has never smoked. He has never used smokeless tobacco. He reports that he does not drink alcohol and does not use drugs.    Family History: Their family history includes Breast cancer in his mother; Cancer in his father; Cirrhosis in his mother; Diabetes in his mother and sister; Heart disease in his mother; Hypertension in his mother; Hypotension in his father; Lung cancer in his father. Family history of tremor     Allergies: Oxybutynin; Sulfa (sulfonamide antibiotics); and Latex, natural rubber     Meds:   Current Outpatient Medications on File Prior to Visit   Medication Sig Dispense Refill    abacavir-lamivudine (EPZICOM) 600-300 mg per tablet TAKE 1 TABLET BY MOUTH EVERY DAY 90 tablet 3    acetaminophen (TYLENOL) 650 MG TbSR Take 650 mg by mouth as needed.      benazepriL (LOTENSIN) 10 MG tablet TAKE 1 TABLET BY MOUTH EVERY DAY IN THE EVENING 90 tablet 3    cholecalciferol, vitamin D3, (VITAMIN D3) 50 mcg (2,000 unit) Tab Take by mouth once daily.      diltiaZEM (CARDIZEM SR) 60 MG Cp12 TAKE 1 CAPSULE BY MOUTH  TWICE A  capsule 3    docusate sodium (COLACE ORAL) Take 1 tablet by mouth 2 (two) times daily as needed.      ELIQUIS 2.5 mg Tab TAKE 1 TABLET BY MOUTH TWICE A DAY 60 tablet 5    fluticasone propionate (FLONASE) 50 mcg/actuation nasal spray 1 spray (50 mcg total) by Each Nostril route once daily. 9.9 mL 0    FOLIC ACID/MULTIVITS-MIN (MEN'S DAILY FORMULA ORAL) Take 1 tablet by mouth once daily.      gabapentin (NEURONTIN) 300 MG capsule Take 2 capsules (600 mg total) by mouth 3 (three) times daily. 540 capsule 1    mirabegron (MYRBETRIQ) 50 mg Tb24 Take 1 tablet (50 mg total) by mouth once daily. 90 tablet 3    pantoprazole (PROTONIX) 40 MG tablet TAKE 1 TABLET BY MOUTH EVERY DAY 90 tablet 3    PREZISTA 800 mg Tab TAKE 1 TABLET BY MOUTH EVERY DAY WITH BREAKFAST 30 tablet 11    ritonavir (NORVIR) 100 mg Tab tablet TAKE 1 TABLET BY MOUTH EVERY DAY 90 tablet 3    rosuvastatin (CRESTOR) 40 MG Tab TAKE 1 TABLET BY MOUTH EVERY DAY IN THE EVENING 90 tablet 3    simethicone (GAS-X ORAL) Take 1 tablet by mouth daily as needed.      sumatriptan (IMITREX) 50 MG tablet Take 1 tablet (50 mg total) by mouth once. May repeat dosing 2 hours later x one if no relief. for 1 dose 10 tablet 0    VASCEPA 1 gram Cap TAKE 2 CAPSULES BY MOUTH TWICE A  capsule 5    zolpidem (AMBIEN) 10 mg Tab TAKE 1 TABLET BY MOUTH EVERY DAY IN THE EVENING 30 tablet 2    meloxicam (MOBIC) 7.5 MG tablet Take 1 tablet (7.5 mg total) by mouth once daily. 90 tablet 0    traMADoL (ULTRAM) 50 mg tablet Take 1 tablet (50 mg total) by mouth every 6 (six) hours as needed for Pain. 30 tablet 3    [DISCONTINUED] carbidopa-levodopa  mg (SINEMET CR)  mg TbSR TAKE 1 TABLET BY MOUTH TWICE A  tablet 1     No current facility-administered medications on file prior to visit.       Exam:  /76 (BP Location: Left arm, Patient Position: Sitting, BP Method: Medium (Manual))   Pulse 72   Resp 16   Ht 6' (1.829 m)   Wt 65.5 kg (144 lb 6.4  oz)   BMI 19.58 kg/m²     Constitutional  Well-developed, well-nourished, appears stated age   Cardiovascular  No LE edema bilaterally   Neurological    * Mental status  MOCA = not done during today's visit     - Orientation  Oriented to conversation     - Memory   Intact recent and remote     - Attention/concentration  Attentive, vigilant during exam     - Language  Intact to conversation.     - Fund of knowledge  Aware of current events     - Executive  Well-organized thoughts     - Other     * Cranial nerves       - CN II  Pupils equal, visual fields full to confrontation     - CN III, IV, VI  Extraocular movements full, normal pursuits and saccades         - CN VII  Face strong and symmetric bilaterally     - CN VIII  Hearing intact bilaterally         - CN XI  SCM and trapezius 5/5 bilaterally       * Motor  Muscle bulk normal, strength 5/5 throughout   * Sensory   Intact to light touch throughout   * Coordination  No dysmetria with finger-to-nose    * Gait  See below.   * Deep tendon reflexes  2+ and symmetric throughout   Babinski downgoing bilaterally   * Specialized movement exam Gen: masked facies and reduced blink   Speech: hypophonic with vocal tremor   Tremor: head tremor and vocal tremor, bilateral rest tremor (R>L) in the hands and legs, action >>> postural tremor bilaterally; chin tremor   Bradykinesia: some difficulty on the left   Tone: normal   Gait: walking looks good, posture slightly stooped, left arm swing reduction, pivots to turn      Medical Record Review:  Labs, imaging and prior notes reviewed independently.       Diagnoses:          1. Essential tremor        2. Parkinsonism, unspecified Parkinsonism type        3. Neuropathy        4. HIV infection, unspecified symptom status                Assessment:  Mr. Guerrier is a 79 yo RH man with tremor involving his bilateral hands (R>L) since his 40s. The tremor is most noticeable with action. He also has vocal tremor, head tremor and  occasional chin and right foot tremor as well. His balance is poor but he does not shuffle or fall. He has no non-motor symptoms of PD. He does have some very subtle parkinsonian features such as rest tremor (involving L>R side) and possibly some bradykinesia vs interruptions in his hand movements (L>R). His speech is hypophonic and he does report some drooling at times. We discussed that we are very limited in oral medications to treat the action component of his tremors due to his use of eliquis and HAART and inability to tolerate propranolol. We even briefly touched on lesional therapies keeping in mind his surgical risks in the setting of Eliquis. We have agreed to the following:    Plan:  - He uses GBP for neuropathy which he states is mild. We discussed that this medication may also be reducing his action/postural tremor. We will attempt to reduce to 300mg TID once again and we will re-try Sinemet IR over the next 6 weeks to see if his blood pressure will tolerate it.   -Unable to use Propranolol (symptomatic bradycardia), Primidone (elquis and HAART) or TPM (HAART). We can consider baclofen in the future but worried about sedation.  - Using assistive devices such as weighted utensils, bracelets and pens.   - OT completed for tremor was minimally helpful.     RTC in 3 months to see me.     Pema Molina MD  Division of Movement and Memory Disorders  [facility]  [phone]

## 2024-06-14 ENCOUNTER — OFFICE VISIT (OUTPATIENT)
Dept: URGENT CARE | Facility: CLINIC | Age: 79
End: 2024-06-14
Payer: MEDICARE

## 2024-06-14 VITALS
RESPIRATION RATE: 18 BRPM | WEIGHT: 154 LBS | BODY MASS INDEX: 20.86 KG/M2 | DIASTOLIC BLOOD PRESSURE: 69 MMHG | SYSTOLIC BLOOD PRESSURE: 132 MMHG | OXYGEN SATURATION: 97 % | HEART RATE: 69 BPM | TEMPERATURE: 98 F | HEIGHT: 72 IN

## 2024-06-14 DIAGNOSIS — J01.10 ACUTE FRONTAL SINUSITIS, RECURRENCE NOT SPECIFIED: Primary | ICD-10-CM

## 2024-06-14 DIAGNOSIS — R05.9 COUGH, UNSPECIFIED TYPE: ICD-10-CM

## 2024-06-14 LAB
CTP QC/QA: YES
SARS-COV-2 AG RESP QL IA.RAPID: NEGATIVE

## 2024-06-14 PROCEDURE — 87811 SARS-COV-2 COVID19 W/OPTIC: CPT | Mod: QW,S$GLB,, | Performed by: NURSE PRACTITIONER

## 2024-06-14 PROCEDURE — 99214 OFFICE O/P EST MOD 30 MIN: CPT | Mod: S$GLB,,, | Performed by: NURSE PRACTITIONER

## 2024-06-14 RX ORDER — BENZONATATE 100 MG/1
100 CAPSULE ORAL 3 TIMES DAILY PRN
Qty: 10 CAPSULE | Refills: 0 | Status: SHIPPED | OUTPATIENT
Start: 2024-06-14 | End: 2024-06-17

## 2024-06-14 RX ORDER — AZITHROMYCIN 250 MG/1
TABLET, FILM COATED ORAL
Qty: 6 TABLET | Refills: 0 | Status: SHIPPED | OUTPATIENT
Start: 2024-06-14

## 2024-06-14 NOTE — PROGRESS NOTES
Subjective:      Patient ID: Tacho Guerrier Jr. is a 78 y.o. male.    Vitals:  height is 6' (1.829 m) and weight is 69.9 kg (154 lb). His oral temperature is 98.1 °F (36.7 °C). His blood pressure is 132/69 and his pulse is 69. His respiration is 18 and oxygen saturation is 97%.     Chief Complaint: Sinus Problem    Sinus Problem  This is a new problem. The current episode started in the past 7 days. The problem has been gradually worsening since onset. There has been no fever. He is experiencing no pain. Associated symptoms include coughing, sinus pressure and a sore throat. Pertinent negatives include no chills or congestion. Past treatments include nothing. The treatment provided no relief.       Constitution: Negative. Negative for chills.   HENT:  Positive for sinus pressure and sore throat. Negative for congestion.    Neck: neck negative.   Cardiovascular: Negative.    Respiratory:  Positive for cough and sputum production.       Objective:     Physical Exam   Constitutional: He is oriented to person, place, and time. He appears well-developed. He is cooperative.  Non-toxic appearance. He does not appear ill. No distress.   HENT:   Head: Normocephalic and atraumatic.   Ears:   Right Ear: Hearing, tympanic membrane, external ear and ear canal normal.   Left Ear: Hearing, tympanic membrane, external ear and ear canal normal.   Nose: Mucosal edema and congestion present. No rhinorrhea or nasal deformity. No epistaxis. Right sinus exhibits frontal sinus tenderness. Right sinus exhibits no maxillary sinus tenderness. Left sinus exhibits frontal sinus tenderness. Left sinus exhibits no maxillary sinus tenderness.   Mouth/Throat: Uvula is midline, oropharynx is clear and moist and mucous membranes are normal. No trismus in the jaw. Normal dentition. No uvula swelling. No oropharyngeal exudate, posterior oropharyngeal edema or posterior oropharyngeal erythema.   Eyes: Conjunctivae and lids are normal. No scleral  icterus.   Neck: Trachea normal and phonation normal. Neck supple. No edema present. No erythema present. No neck rigidity present.   Cardiovascular: Normal rate, regular rhythm, normal heart sounds and normal pulses.   Pulmonary/Chest: Effort normal and breath sounds normal. No respiratory distress. He has no decreased breath sounds. He has no rhonchi.   Abdominal: Normal appearance.   Musculoskeletal: Normal range of motion.         General: No deformity. Normal range of motion.   Neurological: He is alert and oriented to person, place, and time. He exhibits normal muscle tone. Coordination normal.   Skin: Skin is warm, dry, intact, not diaphoretic and not pale.   Psychiatric: His speech is normal and behavior is normal. Judgment and thought content normal.   Nursing note and vitals reviewed.      Assessment:     1. Acute frontal sinusitis, recurrence not specified    2. Cough, unspecified type        Plan:       Acute frontal sinusitis, recurrence not specified  -     azithromycin (ZITHROMAX) 250 MG tablet; Take 2 tablets (500 mg) on  Day 1,  followed by 1 tablet (250 mg) once daily on Days 2 through 5.  Dispense: 6 tablet; Refill: 0    Cough, unspecified type  -     SARS Coronavirus 2 Antigen, POCT Manual Read  -     benzonatate (TESSALON) 100 MG capsule; Take 1 capsule (100 mg total) by mouth 3 (three) times daily as needed for Cough.  Dispense: 10 capsule; Refill: 0      Patient Instructions   1.  Take all antibiotics as prescribed.  It is imperative that once you start them, you take them to completion.     2.  For patients above 6 months of age who are not allergic to and are not on anticoagulants, you can alternate Tylenol and Motrin every 4-6 hours for fever above 100.4F and/or pain.  For patients less than 6 months of age, allergic to or intolerant to NSAIDS, have gastritis, gastric ulcers, or history of GI bleeds, are pregnant, or are on anticoagulant therapy, you can take Tylenol every 4 hours as needed  for fever above 100.4F and/or pain.     3.  Rest and keep yourself well hydrated.  Drink hot liquids (coffee, water, tea, hot chocolate, or soup) 10-12 times a day for 5-7 days.  Put liquid in a mug and place in microwave for 2.5 - 3 minutes. Pour hot liquid into another mug not used to microwave the liquid (to avoid burning your mouth) then sniff the steam from the cup and sip the heated liquid.    4.  You can use these over the counter medications/remedies to help with your symptoms:     Runny Nose:  Use an antihistamine such as Claritin, Zyrtec or Allegra to help dry you out.     Congestion:  Use pseudoephedrine (behind the counter) for congestion- Pseudoephedrine 30 mg up to 240 mg /day. Warning:  It can raise your blood pressure and give you palpitations.  Coricidin HBP is okay to use if you have high blood pressure.     Use mucinex (guaifenisin) up to 2400mg/day to break up/loosen any mucous. MucinexDM has a cough suppressant that can be used for cough and at night to stop the tickle in the back of your throat.    Use Nasal Saline to mechanically move any post nasal drip from your eustachian tubes or from the back of your throat.    Use Afrin in each nare, for no longer than 3 days, as it is addictive. It can also dry out your mucous membranes and cause elevated blood pressure.    Use Flonase 1-2 sprays/nostril per day. It is a local acting steroid nasal spray.  If you develop a bloody nose, stop using the medication immediately.    Sore throat:  Use warm, salt water gargles to ease your throat pain- 1/2 tsp salt to 1 cup warm water, gargle as desired.  Chloraseptic sprays and throat lozenges will also help to ease throat pain.     Sometimes Nyquil at night is beneficial to help you get some rest; however, it is sedating and does contain an antihistamine and Tylenol.  Make sure not to double up on these medications.      These things will help you to feel better and will speed your recovery.  If your condition  fails to improve in a timely manner, you should receive another evaluation by your Primary Care Provider/Pediatrician to discuss your concerns or return to urgent care for a recheck.  If your condition worsens at any time, you should report immediately to your nearest Emergency Department for further evaluation. **You must understand that you have received Urgent Care treatment only and that you may be released before all of your medical problems are known or treated. You, the patient, are responsible to arrange for follow-up care as instructed.       Results for orders placed or performed in visit on 06/14/24   SARS Coronavirus 2 Antigen, POCT Manual Read   Result Value Ref Range    SARS Coronavirus 2 Antigen Negative Negative     Acceptable Yes

## 2024-07-11 ENCOUNTER — OFFICE VISIT (OUTPATIENT)
Dept: NEUROLOGY | Facility: CLINIC | Age: 79
End: 2024-07-11
Payer: MEDICARE

## 2024-07-11 VITALS
RESPIRATION RATE: 16 BRPM | WEIGHT: 150.81 LBS | HEIGHT: 72 IN | BODY MASS INDEX: 20.43 KG/M2 | HEART RATE: 68 BPM | DIASTOLIC BLOOD PRESSURE: 70 MMHG | SYSTOLIC BLOOD PRESSURE: 126 MMHG

## 2024-07-11 DIAGNOSIS — G25.0 ESSENTIAL TREMOR: ICD-10-CM

## 2024-07-11 DIAGNOSIS — I48.91 ATRIAL FIBRILLATION, UNSPECIFIED TYPE: ICD-10-CM

## 2024-07-11 DIAGNOSIS — G20.C PARKINSONISM, UNSPECIFIED PARKINSONISM TYPE: Primary | ICD-10-CM

## 2024-07-11 DIAGNOSIS — Z21 ASYMPTOMATIC HIV INFECTION, WITH NO HISTORY OF HIV-RELATED ILLNESS: ICD-10-CM

## 2024-07-11 PROCEDURE — 1126F AMNT PAIN NOTED NONE PRSNT: CPT | Mod: CPTII,S$GLB,, | Performed by: STUDENT IN AN ORGANIZED HEALTH CARE EDUCATION/TRAINING PROGRAM

## 2024-07-11 PROCEDURE — 99214 OFFICE O/P EST MOD 30 MIN: CPT | Mod: S$GLB,,, | Performed by: STUDENT IN AN ORGANIZED HEALTH CARE EDUCATION/TRAINING PROGRAM

## 2024-07-11 PROCEDURE — 99999 PR PBB SHADOW E&M-EST. PATIENT-LVL V: CPT | Mod: PBBFAC,,, | Performed by: STUDENT IN AN ORGANIZED HEALTH CARE EDUCATION/TRAINING PROGRAM

## 2024-07-11 PROCEDURE — 3078F DIAST BP <80 MM HG: CPT | Mod: CPTII,S$GLB,, | Performed by: STUDENT IN AN ORGANIZED HEALTH CARE EDUCATION/TRAINING PROGRAM

## 2024-07-11 PROCEDURE — G2211 COMPLEX E/M VISIT ADD ON: HCPCS | Mod: S$GLB,,, | Performed by: STUDENT IN AN ORGANIZED HEALTH CARE EDUCATION/TRAINING PROGRAM

## 2024-07-11 PROCEDURE — 3074F SYST BP LT 130 MM HG: CPT | Mod: CPTII,S$GLB,, | Performed by: STUDENT IN AN ORGANIZED HEALTH CARE EDUCATION/TRAINING PROGRAM

## 2024-07-11 PROCEDURE — 1159F MED LIST DOCD IN RCRD: CPT | Mod: CPTII,S$GLB,, | Performed by: STUDENT IN AN ORGANIZED HEALTH CARE EDUCATION/TRAINING PROGRAM

## 2024-07-11 RX ORDER — LEVODOPA AND CARBIDOPA 95; 23.75 MG/1; MG/1
3 CAPSULE, EXTENDED RELEASE ORAL 3 TIMES DAILY
Qty: 126 CAPSULE | Refills: 0 | Status: SHIPPED | OUTPATIENT
Start: 2024-07-11 | End: 2024-07-25

## 2024-07-11 NOTE — PATIENT INSTRUCTIONS
I want you to begin taking Rytary 95mg. 1 capsule three times/day at the same times your were taking Sinemet. If you are noticing lower blood pressure you can try reducing this to twice daily.       Look into focused ultrasound and deep brain stimulation for essential tremor.

## 2024-07-11 NOTE — PROGRESS NOTES
Name: Tacho Guerrier Jr.  MRN: [id number]   [facility]: [id number]      Date: 07/11/2024      Chief Complaint / Interval History: Tremor    Tacho Guerrier Jr. is a 77 y.o. male with many years of benign essential tremor. He has a history of HIV, diagnosed in the 1990's. He also has depression, HTN, HLD, insomnia, and A-fib and history of SVT.     History of Present Illness (HPI):    Mr. Guerrier is a 78 yo RH man who presents for evaluation of tremor. His tremors began around age 44 involving his bilateral hands (R>L). He also more recently has occasional right leg and chin tremor. He has voice tremor which is not noticeable to him. His balance is not great but he is not falling. He does not consume caffeine. His tremor does respond to ETOH however he no longer drinks. His grandfather and aunt also had tremors. His handwriting is sloppy. He has not found an oral medication that helps reduce his tremor. He has been unable to tolerate propranolol and gabapentin even at relatively high doses has not helped much.     Interval History:  Mr. Guerrier presents for follow-up.   He recently (3 weeks ago) had a bacterial infection which caused his symptoms to worsen. He has also noticed a bit more mental fogging. Still having no issue with driving and able to maintain his own finances and medications independently.   He is not currently taking any Sinemet as both the IR at 1/2 tab TID and the CR at 1 tab BID caused his blood pressure to drop although he did find that it helped his tremor minimally.       Current Mvmt Medications:  GBP 600mg TID     Prior Mvmt Medication Trials:  Propranolol - caused symptomatic bradycardia   Primidone - can't use in the setting of eliquis and HAART therapy  TPM - would induce his HAART therapy and make less effective  Sinemet 25/100 CR 1 tab BID - caused dizziness but slightly effective  Sinemet 25/100 IR 1/2 tab TID - caused dizziness but slightly effective     Nonmotor ROS:  Smell/Taste: no  issues  Voice/Swallowing: no changes in voice or swallowing   - sometimes drooling   Gait/Falls: no issues  Exercise: yes, walks regularly   Dizziness: none   Hydration: does well with this   Urinary Issues: none  Constipation: occasional - using fiber gummies   Sleep/RBD: sleeps for 3-4 hours at a time, no active dreams   Hallucinations/Peripheral Illusions: no issues  Memory/Cognition/Language: doing ok   Mood: a bit worse     Past Medical History: The patient  has a past medical history of A-fib, Atrial flutter, Heart murmur, HIV (human immunodeficiency virus infection), Other and unspecified hyperlipidemia, SVT (supraventricular tachycardia), and Tachycardia. HTN    Relevant Surgical History:   Past Surgical History:   Procedure Laterality Date    COLONOSCOPY  2013    TVA    COLONOSCOPY N/A 5/5/2017    Procedure: COLONOSCOPY;  Surgeon: Mode Rae MD;  Location: [facility];  Service: Endoscopy;  Laterality: N/A;    COLONOSCOPY N/A 8/18/2023    Procedure: COLONOSCOPY;  Surgeon: Carlene Cm MD;  Location: [facility];  Service: Endoscopy;  Laterality: N/A;    PROSTATE SURGERY      TONSILLECTOMY      UPPER GASTROINTESTINAL ENDOSCOPY      in the 70s per pt        Social History: The patient  reports that he has never smoked. He has never been exposed to tobacco smoke. He has never used smokeless tobacco. He reports that he does not drink alcohol and does not use drugs.    Family History: Their family history includes Breast cancer in his mother; Cancer in his father; Cirrhosis in his mother; Diabetes in his mother and sister; Heart disease in his mother; Hypertension in his mother; Hypotension in his father; Lung cancer in his father. Family history of tremor     Allergies: Oxybutynin; Sulfa (sulfonamide antibiotics); and Latex, natural rubber     Meds:   Current Outpatient Medications on File Prior to Visit   Medication Sig Dispense Refill    abacavir-lamivudine (EPZICOM) 600-300 mg per tablet TAKE 1  TABLET BY MOUTH EVERY DAY 90 tablet 3    acetaminophen (TYLENOL) 650 MG TbSR Take 650 mg by mouth as needed.      benazepriL (LOTENSIN) 10 MG tablet TAKE 1 TABLET BY MOUTH EVERY DAY IN THE EVENING 90 tablet 3    cholecalciferol, vitamin D3, (VITAMIN D3) 50 mcg (2,000 unit) Tab Take by mouth once daily.      darunavir (PREZISTA) 800 mg Tab TAKE 1 TABLET BY MOUTH EVERY DAY WITH BREAKFAST 30 tablet 11    diltiaZEM (CARDIZEM SR) 60 MG Cp12 TAKE 1 CAPSULE BY MOUTH TWICE A  capsule 3    docusate sodium (COLACE ORAL) Take 1 tablet by mouth 2 (two) times daily as needed.      ELIQUIS 2.5 mg Tab TAKE 1 TABLET BY MOUTH TWICE A DAY 60 tablet 5    fluticasone propionate (FLONASE) 50 mcg/actuation nasal spray 1 spray (50 mcg total) by Each Nostril route once daily. 9.9 mL 0    FOLIC ACID/MULTIVITS-MIN (MEN'S DAILY FORMULA ORAL) Take 1 tablet by mouth once daily.      gabapentin (NEURONTIN) 300 MG capsule Take 2 capsules (600 mg total) by mouth 3 (three) times daily. 540 capsule 1    hydrOXYzine pamoate (VISTARIL) 50 MG Cap Take 1 capsule (50 mg total) by mouth every 8 (eight) hours as needed (anxiety). 30 capsule 0    meloxicam (MOBIC) 7.5 MG tablet Take 1 tablet (7.5 mg total) by mouth once daily. 90 tablet 0    mirabegron (MYRBETRIQ) 50 mg Tb24 Take 1 tablet (50 mg total) by mouth once daily. 90 tablet 3    ondansetron (ZOFRAN-ODT) 4 MG TbDL Take 1 tablet (4 mg total) by mouth every 6 (six) hours as needed (nausea). 30 tablet 0    pantoprazole (PROTONIX) 40 MG tablet TAKE 1 TABLET BY MOUTH EVERY DAY 90 tablet 3    ritonavir (NORVIR) 100 mg Tab tablet Take 1 tablet (100 mg total) by mouth once daily. 90 tablet 3    rosuvastatin (CRESTOR) 40 MG Tab TAKE 1 TABLET BY MOUTH EVERY DAY IN THE EVENING 90 tablet 3    simethicone (GAS-X ORAL) Take 1 tablet by mouth daily as needed.      sumatriptan (IMITREX) 50 MG tablet Take 1 tablet (50 mg total) by mouth once. May repeat dosing 2 hours later x one if no relief. for 1 dose 10  tablet 0    VASCEPA 1 gram Cap TAKE 2 CAPSULES BY MOUTH TWICE A  capsule 5    zolpidem (AMBIEN) 10 mg Tab TAKE 1 TABLET BY MOUTH EVERY DAY IN THE EVENING 30 tablet 2    [DISCONTINUED] carbidopa-levodopa  mg (SINEMET)  mg per tablet Take 1 tablet by mouth 3 (three) times daily. 90 tablet 11    [DISCONTINUED] amoxicillin-clavulanate 875-125mg (AUGMENTIN) 875-125 mg per tablet Take 1 tablet by mouth 2 (two) times daily. (Patient not taking: Reported on 6/25/2024) 14 tablet 0    [DISCONTINUED] azithromycin (ZITHROMAX) 250 MG tablet Take 2 tablets (500 mg) on  Day 1,  followed by 1 tablet (250 mg) once daily on Days 2 through 5. 6 tablet 0    [DISCONTINUED] traMADoL (ULTRAM) 50 mg tablet Take 1 tablet (50 mg total) by mouth every 6 (six) hours as needed for Pain. 30 tablet 3     Current Facility-Administered Medications on File Prior to Visit   Medication Dose Route Frequency Provider Last Rate Last Admin    triamcinolone acetonide injection 40 mg  40 mg Intra-articular  Julian Juares MD   40 mg at 04/29/24 1218       Exam:  /70 (BP Location: Left arm, Patient Position: Sitting, BP Method: Medium (Manual))   Pulse 68   Resp 16   Ht 6' (1.829 m)   Wt 68.4 kg (150 lb 12.7 oz)   BMI 20.45 kg/m²     Constitutional  Well-developed, well-nourished, appears stated age   Cardiovascular  No LE edema bilaterally   Neurological    * Mental status  MOCA = not done during today's visit     - Orientation  Oriented to conversation     - Memory   Intact recent and remote     - Attention/concentration  Attentive, vigilant during exam     - Language  Intact to conversation.     - Fund of knowledge  Aware of current events     - Executive  Well-organized thoughts     - Other     * Cranial nerves       - CN II  Pupils equal, visual fields full to confrontation     - CN III, IV, VI  Extraocular movements full, normal pursuits and saccades         - CN VII  Face strong and symmetric bilaterally     - CN VIII   Hearing intact bilaterally         - CN XI  SCM and trapezius 5/5 bilaterally       * Motor  Muscle bulk normal, strength 5/5 throughout   * Sensory   Intact to light touch throughout   * Coordination  No dysmetria with finger-to-nose    * Gait  See below.   * Deep tendon reflexes  2+ and symmetric throughout   Babinski downgoing bilaterally   * Specialized movement exam Gen: masked facies and reduced blink   Speech: hypophonic with vocal tremor   Tremor: head tremor and vocal tremor, bilateral rest tremor (R>L) in the hands and legs, action >>> postural tremor bilaterally; chin tremor   Bradykinesia: some difficulty on the left   Tone: slight cogwheel on the RUE    Gait: walking looks good, posture slightly stooped, left arm swing reduction, pivots to turn      Medical Record Review:  Labs, imaging and prior notes reviewed independently.       Diagnoses:          1. Parkinsonism, unspecified Parkinsonism type  carbidopa-levodopa (RYTARY) 23.75-95 mg CpSR      2. Essential tremor        3. Asymptomatic HIV infection, with no history of HIV-related illness        4. Atrial fibrillation, unspecified type                  Assessment:  Mr. Guerrier is a 79 yo RH man with tremor involving his bilateral hands (R>L) since his 40s. The tremor is most noticeable with action. He also has vocal tremor, head tremor and occasional chin and right foot tremor as well. His balance is poor but he does not shuffle or fall. He has a few non-motor symptoms of PD such as constipation. He does have some very subtle parkinsonian features such as rest tremor (involving L>R side) and possibly some bradykinesia vs interruptions in his hand movements (L>R). His speech is hypophonic and he does report some drooling at times. We discussed that we are very limited in oral medications to treat the action component of his tremors due to his use of eliquis and HAART and inability to tolerate propranolol. We even briefly touched on lesional therapies  keeping in mind his surgical risks in the setting of Eliquis. We have agreed to the following:    Plan:  - He uses GBP for neuropathy which he states is mild. We discussed that this medication may also be reducing his action/postural tremor. He has been unable to tolerate Sinemet IR or CR due to hypotension however does feel that it did help his tremor. We will try very low dose Rytary 95mg 1 capsule TID to see if this is better tolerated. I have given him a voucher.   -Unable to use Propranolol (symptomatic bradycardia), Primidone (elquis and HAART) or TPM (HAART). We can consider baclofen in the future but worried about sedation.  - Using assistive devices such as weighted utensils, bracelets and pens.   - OT completed for tremor was minimally helpful.   - We discussed DBS and FUS which may be our next best option should his tremor become more bothersome.     RTC in 4 months to see me.     Pema Molina MD  Division of Movement and Memory Disorders  [facility]  [phone]

## 2024-08-08 DIAGNOSIS — M79.2 NEUROPATHIC PAIN: ICD-10-CM

## 2024-08-08 DIAGNOSIS — G25.0 ESSENTIAL TREMOR: Chronic | ICD-10-CM

## 2024-08-13 RX ORDER — GABAPENTIN 300 MG/1
600 CAPSULE ORAL 3 TIMES DAILY
Qty: 540 CAPSULE | Refills: 1 | Status: SHIPPED | OUTPATIENT
Start: 2024-08-13

## 2024-08-23 DIAGNOSIS — G20.C PARKINSONISM, UNSPECIFIED PARKINSONISM TYPE: ICD-10-CM

## 2024-08-23 RX ORDER — LEVODOPA AND CARBIDOPA 95; 23.75 MG/1; MG/1
3 CAPSULE, EXTENDED RELEASE ORAL 3 TIMES DAILY
Qty: 126 CAPSULE | Refills: 0 | Status: SHIPPED | OUTPATIENT
Start: 2024-08-23

## 2024-09-28 ENCOUNTER — OFFICE VISIT (OUTPATIENT)
Dept: URGENT CARE | Facility: CLINIC | Age: 79
End: 2024-09-28
Payer: MEDICARE

## 2024-09-28 VITALS
HEIGHT: 73 IN | TEMPERATURE: 98 F | WEIGHT: 154.75 LBS | DIASTOLIC BLOOD PRESSURE: 70 MMHG | BODY MASS INDEX: 20.51 KG/M2 | RESPIRATION RATE: 18 BRPM | SYSTOLIC BLOOD PRESSURE: 120 MMHG | HEART RATE: 66 BPM | OXYGEN SATURATION: 97 %

## 2024-09-28 DIAGNOSIS — S41.112A LACERATION OF LEFT UPPER EXTREMITY, INITIAL ENCOUNTER: Primary | ICD-10-CM

## 2024-09-28 PROCEDURE — 90471 IMMUNIZATION ADMIN: CPT | Mod: S$GLB,,, | Performed by: FAMILY MEDICINE

## 2024-09-28 PROCEDURE — 99214 OFFICE O/P EST MOD 30 MIN: CPT | Mod: 25,S$GLB,, | Performed by: FAMILY MEDICINE

## 2024-09-28 PROCEDURE — 90714 TD VACC NO PRESV 7 YRS+ IM: CPT | Mod: S$GLB,,, | Performed by: FAMILY MEDICINE

## 2024-09-28 RX ORDER — CEPHALEXIN 250 MG/1
250 CAPSULE ORAL 4 TIMES DAILY
Qty: 40 CAPSULE | Refills: 0 | Status: SHIPPED | OUTPATIENT
Start: 2024-09-28 | End: 2024-10-08

## 2024-09-28 RX ORDER — MUPIROCIN 20 MG/G
OINTMENT TOPICAL 2 TIMES DAILY
Qty: 30 G | Refills: 0 | Status: SHIPPED | OUTPATIENT
Start: 2024-09-28

## 2024-09-28 NOTE — PATIENT INSTRUCTIONS
Please drink plenty of fluids.  Please get plenty of rest.  Please return here or go to the Emergency Department for any concerns or worsening of condition.  If you were prescribed antibiotics, please take them to completion.  If you were prescribed a narcotic medication, do not drive or operate heavy equipment or machinery while taking these medications.      If not allergic, please take over the counter Tylenol (Acetaminophen) and/or Motrin (Ibuprofen) as directed for control of pain and/or fever.    Your tetanus was brought up to date if needed.    Keep wound clean and dry.  You may use a plastic bag to keep area dry while showering    Clean wound once or twice a day with soap and water, then apply antibiotic ointment and redress wound.      Please follow up with your primary care doctor or specialist as needed.  Gene Yusuf Jr., MD  [phone]    You must understand that you have received treatment at an Urgent Care facility only, and that you may be  released before all of your medical problems are known or treated. Urgent Care facilities are not equipped to  handle life threatening emergencies. It is recommended that you seek care at an Emergency Department for  further evaluation of worsening or concerning symptoms, or possibly life threatening conditions as  Discussed.    Laceration: All Closures  A laceration is a cut through the skin. This will usually require stitches (sutures) or staples if it is deep. Minor cuts may be treated with a surgical tape closure or skin glue.    Home care  Your healthcare provider may prescribe an antibiotic. This is to help prevent infection. Follow all instructions for taking this medicine. Take the medicine every day until it is gone or you are told to stop. You should not have any left over.  The healthcare provider may prescribe medicines for pain. Follow instructions for taking them.  Follow the healthcare providers instructions on how to care for the cut.  Keep  the wound clean and dry. Do not get the wound wet until you are told it is okay to do so. If the area gets wet, gently pat it dry with a clean cloth. Replace the wet bandage with a dry one.  If a bandage was applied and it becomes wet or dirty, replace it. Otherwise, leave it in place for the first 24 hours.  Caring for sutures or staples: Once you no longer need to keep them dry, clean the wound daily. First, remove the bandage. Then wash the area gently with soap and warm water, or as directed by the health care provider. Use a wet cotton swab to loosen and remove any blood or crust that forms. After cleaning, apply a thin layer of antibiotic ointment if advised. Then put on a new bandage unless you are told not to.  Caring for skin glue: Dont put apply liquid, ointment, or cream on the wound while the glue is in place. Avoid activities that cause heavy sweating. Protect the wound from sunlight. Do not scratch, rub, or pick at the adhesive film. Do not place tape directly over the film. The glue should peel off within 5 to 10 days.   Caring for surgical tape: Keep the area dry. If it gets wet, blot it dry with a clean towel. Surgical tape usually falls off within 7 to 10 days. If it has not fallen off after 10 days, you can take it off yourself. Put mineral oil or petroleum jelly on a cotton ball and gently rub the tape until it is removed.  Once you can get the wound wet, you may shower as usual but do not soak the wound in water (no tub baths or swimming)  Even with proper treatment, a wound infection may sometimes occur. Check the wound daily for signs of infection listed below.  Scalp wounds  During the first two days, you may carefully rinse your hair in the shower to remove blood, glass or dirt particles. After two days, you may shower and shampoo your hair normally. Do not soak your scalp in the tub or go swimming until the stitches or staples have been removed. Talk with your healthcare provider before  applying any antibiotic ointment to the wound.  Mouth wounds  Eat soft foods to reduce pain. If the cut is inside of your mouth, clean by rinsing after each meal and at bedtime with a mixture of equal parts water and hydrogen peroxide (do not swallow!). Or, you can use a cotton swab to directly apply hydrogen peroxide onto the cut. Mouth wounds can be painful when eating. You may use an over-the-counter local numbing solution for pain relief. If this is not available, you may use any numbing solution intended for teething babies. You may apply this directly to the sores with a cotton-tip swab or with your finger.  Follow-up care  Follow up with your healthcare provider as advised. Ask your healthcare provider how long sutures should be left in place. Be sure to return for suture removal as directed. If dissolving stitches were used in the mouth, these should fall out or dissolve without the need for removal. If tape closures were used, remove them yourself when your provider recommends if they have not fallen off on their own. If skin glue was used, the film will wear off by itself.  When to seek medical advice  Call your healthcare provider right away if any of these occur:  Wound bleeding not controlled by direct pressure  Signs of infection, including increasing pain in the wound, increasing wound redness or swelling, or pus or bad odor coming from the wound  Fever of 100.4°F (38.ºC) or higher or as directed by your healthcare provider  Stitches or staples come apart or fall out or surgical tape falls off before 7 days  Wound edges re-open  Wound changes colors  Numbness around the wound   Decreased movement around the injured area  Date Last Reviewed: 6/14/2015 © 2000-2016 [employer]. [address] [place] [address]. All rights reserved. This information is not intended as a substitute for professional medical care. Always follow your healthcare professional's instructions.

## 2024-09-28 NOTE — LETTER
September 28, 2024  Tacho Guerrier .  [address] [facility]  [place] [address]                [facility] - [address]  [facility] [place] [address]  Phone: [phone]  Fax: [phone] Tacho Guerrier was seen and treated in our Urgent Care department on 9/28/2024. He may return to work in 2 - 3 days.      If you have any questions or concerns, please don't hesitate to call.        Sincerely,        Pierre Wolfe MD

## 2024-09-28 NOTE — PROGRESS NOTES
"Subjective:      Patient ID: Tacho Guerrier Jr. is a 78 y.o. male.    Vitals:  height is 6' 0.84" (1.85 m) and weight is 70.2 kg (154 lb 12.2 oz). His oral temperature is 97.8 °F (36.6 °C). His blood pressure is 120/70 and his pulse is 66. His respiration is 18 and oxygen saturation is 97%.     Chief Complaint: Arm Pain    Patient says he brushed up against the door and scraped up his left elbow.    Arm Pain   His dominant hand is their left hand. The incident occurred 6 to 12 hours ago. The incident occurred at home. The injury mechanism was a direct blow. The pain is present in the left elbow. The quality of the pain is described as burning. The pain does not radiate. The pain is at a severity of 5/10. The pain is mild. The pain has been Constant since the incident. Nothing aggravates the symptoms. He has tried nothing for the symptoms. The treatment provided no relief.       Constitution: Negative.   HENT: Negative.     Cardiovascular: Negative.    Eyes: Negative.    Respiratory: Negative.     Gastrointestinal: Negative.    Endocrine: negative.   Genitourinary: Negative.    Musculoskeletal: Negative.    Skin: Negative.    Allergic/Immunologic: Negative.    Neurological: Negative.    Hematologic/Lymphatic: Negative.    Psychiatric/Behavioral: Negative.        Objective:     Physical Exam   Constitutional: He is oriented to person, place, and time. He appears well-developed. He is cooperative.  Non-toxic appearance. He does not appear ill. No distress.   HENT:   Head: Normocephalic and atraumatic. Head is without abrasion, without contusion and without laceration.   Ears:   Right Ear: Hearing, tympanic membrane, external ear and ear canal normal. No hemotympanum.   Left Ear: Hearing, tympanic membrane, external ear and ear canal normal. No hemotympanum.   Nose: Nose normal. No mucosal edema, rhinorrhea or nasal deformity. No epistaxis. Right sinus exhibits no maxillary sinus tenderness and no frontal sinus " tenderness. Left sinus exhibits no maxillary sinus tenderness and no frontal sinus tenderness.   Mouth/Throat: Uvula is midline, oropharynx is clear and moist and mucous membranes are normal. No trismus in the jaw. Normal dentition. No uvula swelling. No posterior oropharyngeal erythema.   Eyes: Conjunctivae, EOM and lids are normal. Pupils are equal, round, and reactive to light. Right eye exhibits no discharge. Left eye exhibits no discharge. No scleral icterus.   Neck: Trachea normal and phonation normal. Neck supple. No tracheal deviation present. No neck rigidity present. No spinous process tenderness present. No muscular tenderness present.   Cardiovascular: Normal rate, regular rhythm, normal heart sounds and normal pulses.   Pulmonary/Chest: Effort normal and breath sounds normal. No respiratory distress.   Abdominal: Normal appearance and bowel sounds are normal. He exhibits no distension and no mass. Soft. There is no abdominal tenderness.   Musculoskeletal: Normal range of motion.         General: No deformity. Normal range of motion.   Neurological: He is alert and oriented to person, place, and time. He has normal strength. No cranial nerve deficit or sensory deficit. He exhibits normal muscle tone. He displays no seizure activity. Coordination normal. GCS eye subscore is 4. GCS verbal subscore is 5. GCS motor subscore is 6.   Skin: Skin is warm, dry, intact, not diaphoretic and not pale. Capillary refill takes less than 2 seconds. No abrasion, No burn, No bruising and No ecchymosis        Psychiatric: His speech is normal and behavior is normal. Judgment and thought content normal.   Nursing note and vitals reviewed.      Assessment:     1. Laceration of left upper extremity, initial encounter        Plan:       Laceration of left upper extremity, initial encounter  -     cephALEXin (KEFLEX) 250 MG capsule; Take 1 capsule (250 mg total) by mouth 4 (four) times daily. for 10 days  Dispense: 40 capsule;  Refill: 0  -     mupirocin (BACTROBAN) 2 % ointment; Apply topically 2 (two) times daily.  Dispense: 30 g; Refill: 0  -     Td Vaccine (Adult) - Preservative Free      Please drink plenty of fluids.  Please get plenty of rest.  Please return here or go to the Emergency Department for any concerns or worsening of condition.  If you were prescribed antibiotics, please take them to completion.  If you were prescribed a narcotic medication, do not drive or operate heavy equipment or machinery while taking these medications.      If not allergic, please take over the counter Tylenol (Acetaminophen) and/or Motrin (Ibuprofen) as directed for control of pain and/or fever.    Your tetanus was brought up to date if needed.    Keep wound clean and dry.  You may use a plastic bag to keep area dry while showering    Clean wound once or twice a day with soap and water, then apply antibiotic ointment and redress wound.        Please follow up with your primary care doctor or specialist as needed.  Gene Yusuf Jr., MD  [phone]    You must understand that you have received treatment at an Urgent Care facility only, and that you may be  released before all of your medical problems are known or treated. Urgent Care facilities are not equipped to  handle life threatening emergencies. It is recommended that you seek care at an Emergency Department for  further evaluation of worsening or concerning symptoms, or possibly life threatening conditions as  discussed.

## 2024-10-11 DIAGNOSIS — G20.C PARKINSONISM, UNSPECIFIED PARKINSONISM TYPE: ICD-10-CM

## 2024-10-11 RX ORDER — LEVODOPA AND CARBIDOPA 95; 23.75 MG/1; MG/1
3 CAPSULE, EXTENDED RELEASE ORAL 3 TIMES DAILY
Qty: 126 CAPSULE | Refills: 0 | Status: SHIPPED | OUTPATIENT
Start: 2024-10-11

## 2024-11-14 ENCOUNTER — OFFICE VISIT (OUTPATIENT)
Dept: NEUROLOGY | Facility: CLINIC | Age: 79
End: 2024-11-14
Payer: MEDICARE

## 2024-11-14 ENCOUNTER — TELEPHONE (OUTPATIENT)
Dept: NEUROLOGY | Facility: CLINIC | Age: 79
End: 2024-11-14

## 2024-11-14 VITALS
DIASTOLIC BLOOD PRESSURE: 62 MMHG | HEART RATE: 68 BPM | WEIGHT: 155.44 LBS | SYSTOLIC BLOOD PRESSURE: 128 MMHG | RESPIRATION RATE: 16 BRPM | HEIGHT: 72 IN | BODY MASS INDEX: 21.05 KG/M2

## 2024-11-14 DIAGNOSIS — Z21 ASYMPTOMATIC HIV INFECTION, WITH NO HISTORY OF HIV-RELATED ILLNESS: ICD-10-CM

## 2024-11-14 DIAGNOSIS — I48.91 ATRIAL FIBRILLATION, UNSPECIFIED TYPE: ICD-10-CM

## 2024-11-14 DIAGNOSIS — G25.0 ESSENTIAL TREMOR: Primary | ICD-10-CM

## 2024-11-14 DIAGNOSIS — M79.2 NEUROPATHIC PAIN: ICD-10-CM

## 2024-11-14 DIAGNOSIS — G20.C PARKINSONISM, UNSPECIFIED PARKINSONISM TYPE: ICD-10-CM

## 2024-11-14 PROCEDURE — 3078F DIAST BP <80 MM HG: CPT | Mod: CPTII,S$GLB,, | Performed by: STUDENT IN AN ORGANIZED HEALTH CARE EDUCATION/TRAINING PROGRAM

## 2024-11-14 PROCEDURE — G2211 COMPLEX E/M VISIT ADD ON: HCPCS | Mod: S$GLB,,, | Performed by: STUDENT IN AN ORGANIZED HEALTH CARE EDUCATION/TRAINING PROGRAM

## 2024-11-14 PROCEDURE — 1159F MED LIST DOCD IN RCRD: CPT | Mod: CPTII,S$GLB,, | Performed by: STUDENT IN AN ORGANIZED HEALTH CARE EDUCATION/TRAINING PROGRAM

## 2024-11-14 PROCEDURE — 1125F AMNT PAIN NOTED PAIN PRSNT: CPT | Mod: CPTII,S$GLB,, | Performed by: STUDENT IN AN ORGANIZED HEALTH CARE EDUCATION/TRAINING PROGRAM

## 2024-11-14 PROCEDURE — 99999 PR PBB SHADOW E&M-EST. PATIENT-LVL V: CPT | Mod: PBBFAC,,, | Performed by: STUDENT IN AN ORGANIZED HEALTH CARE EDUCATION/TRAINING PROGRAM

## 2024-11-14 PROCEDURE — 3074F SYST BP LT 130 MM HG: CPT | Mod: CPTII,S$GLB,, | Performed by: STUDENT IN AN ORGANIZED HEALTH CARE EDUCATION/TRAINING PROGRAM

## 2024-11-14 PROCEDURE — 99215 OFFICE O/P EST HI 40 MIN: CPT | Mod: S$GLB,,, | Performed by: STUDENT IN AN ORGANIZED HEALTH CARE EDUCATION/TRAINING PROGRAM

## 2024-11-14 NOTE — PROGRESS NOTES
Name: Tacho Guerrier Jr.  MRN: [id number]   [facility]: [id number]      Date: 11/14/2024      Chief Complaint / Interval History: Tremor    Tacho Guerrier Jr. is a 77 y.o. male with many years of benign essential tremor. He has a history of HIV, diagnosed in the 1990's. He also has depression, HTN, HLD, insomnia, and A-fib and history of SVT.     History of Present Illness (HPI):    Mr. Guerrier is a 78 yo RH man who presents for evaluation of tremor. His tremors began around age 44 involving his bilateral hands (R>L). He also more recently has occasional right leg and chin tremor. He has voice tremor which is not noticeable to him. His balance is not great but he is not falling. He does not consume caffeine. His tremor does respond to ETOH however he no longer drinks. His grandfather and aunt also had tremors. His handwriting is sloppy. He has not found an oral medication that helps reduce his tremor. He has been unable to tolerate propranolol and gabapentin even at relatively high doses has not helped much.     Interval History:  Mr. Guerrier presents for follow-up.   Since I saw him last he is now taking Rytary 1 capsule TID. He is not sure it is very effective but he is tolerating it without any side effect.   His tremor is still very bothersome. He brings with him today his daughter to hear more about lesional options.   He is on GBP 600mg TID but this is causing some fatigue.     Current Mvmt Medications:  GBP 600mg TID   Rytary (8/12/bedtime)    Prior Mvmt Medication Trials:  Propranolol - caused symptomatic bradycardia   Primidone - can't use in the setting of eliquis and HAART therapy  TPM - would induce his HAART therapy and make less effective  Sinemet 25/100 CR 1 tab BID - caused dizziness but slightly effective  Sinemet 25/100 IR 1/2 tab TID - caused dizziness but slightly effective     Nonmotor ROS:  Smell/Taste: no issues  Voice/Swallowing: no changes in voice or swallowing   - sometimes drooling   Gait/Falls: no  issues  Exercise: yes, walks regularly   Dizziness: none   Hydration: does well with this   Urinary Issues: none  Constipation: occasional - using fiber gummies   Sleep/RBD: sleeps for 3-4 hours at a time, no active dreams   Hallucinations/Peripheral Illusions: no issues  Memory/Cognition/Language: doing ok   Mood: a bit worse     Past Medical History: The patient  has a past medical history of A-fib, Atrial flutter, Heart murmur, HIV (human immunodeficiency virus infection), Other and unspecified hyperlipidemia, SVT (supraventricular tachycardia), and Tachycardia. HTN    Relevant Surgical History:   Past Surgical History:   Procedure Laterality Date    COLONOSCOPY  2013    TVA    COLONOSCOPY N/A 5/5/2017    Procedure: COLONOSCOPY;  Surgeon: Mode Rae MD;  Location: [facility];  Service: Endoscopy;  Laterality: N/A;    COLONOSCOPY N/A 8/18/2023    Procedure: COLONOSCOPY;  Surgeon: Carlene Cm MD;  Location: [facility];  Service: Endoscopy;  Laterality: N/A;    PROSTATE SURGERY      TONSILLECTOMY      UPPER GASTROINTESTINAL ENDOSCOPY      in the 70s per pt        Social History: The patient  reports that he has never smoked. He has never been exposed to tobacco smoke. He has never used smokeless tobacco. He reports that he does not drink alcohol and does not use drugs.    Family History: Their family history includes Breast cancer in his mother; Cancer in his father; Cirrhosis in his mother; Diabetes in his mother and sister; Heart disease in his mother; Hypertension in his mother; Hypotension in his father; Lung cancer in his father. Family history of tremor     Allergies: Oxybutynin; Sulfa (sulfonamide antibiotics); and Latex, natural rubber     Meds:   Current Outpatient Medications on File Prior to Visit   Medication Sig Dispense Refill    abacavir-lamivudine (EPZICOM) 600-300 mg per tablet TAKE 1 TABLET BY MOUTH EVERY DAY 90 tablet 3    acetaminophen (TYLENOL) 650 MG TbSR Take 650 mg by mouth as  needed.      benazepriL (LOTENSIN) 10 MG tablet TAKE 1 TABLET BY MOUTH EVERY DAY IN THE EVENING 90 tablet 3    buPROPion (WELLBUTRIN XL) 150 MG TB24 tablet Take 1 tablet (150 mg total) by mouth once daily. 30 tablet 11    cholecalciferol, vitamin D3, (VITAMIN D3) 50 mcg (2,000 unit) Tab Take by mouth once daily.      darunavir (PREZISTA) 800 mg Tab TAKE 1 TABLET BY MOUTH EVERY DAY WITH BREAKFAST 30 tablet 11    diltiaZEM (CARDIZEM SR) 60 MG Cp12 TAKE 1 CAPSULE BY MOUTH TWICE A  capsule 3    docusate sodium (COLACE ORAL) Take 1 tablet by mouth 2 (two) times daily as needed.      ELIQUIS 2.5 mg Tab TAKE 1 TABLET BY MOUTH TWICE A DAY 60 tablet 5    fluticasone propionate (FLONASE) 50 mcg/actuation nasal spray 1 spray (50 mcg total) by Each Nostril route once daily. 9.9 mL 0    FOLIC ACID/MULTIVITS-MIN (MEN'S DAILY FORMULA ORAL) Take 1 tablet by mouth once daily.      gabapentin (NEURONTIN) 300 MG capsule TAKE 2 CAPSULES BY MOUTH 3 TIMES DAILY. 540 capsule 1    HYDROcodone-acetaminophen (NORCO) 7.5-325 mg per tablet Take 1 tablet by mouth every 12 (twelve) hours as needed for Pain. 45 tablet 0    hydrOXYzine pamoate (VISTARIL) 50 MG Cap Take 1 capsule (50 mg total) by mouth every 8 (eight) hours as needed (anxiety). 30 capsule 0    meloxicam (MOBIC) 7.5 MG tablet Take 1 tablet (7.5 mg total) by mouth once daily. 90 tablet 0    mirabegron (MYRBETRIQ) 50 mg Tb24 Take 1 tablet (50 mg total) by mouth once daily. 90 tablet 3    mupirocin (BACTROBAN) 2 % ointment Apply topically 2 (two) times daily. 30 g 0    ondansetron (ZOFRAN-ODT) 4 MG TbDL Take 1 tablet (4 mg total) by mouth every 6 (six) hours as needed (nausea). 30 tablet 0    pantoprazole (PROTONIX) 40 MG tablet TAKE 1 TABLET BY MOUTH EVERY DAY 90 tablet 3    ritonavir (NORVIR) 100 mg Tab tablet Take 1 tablet (100 mg total) by mouth once daily. 90 tablet 3    rosuvastatin (CRESTOR) 40 MG Tab TAKE 1 TABLET BY MOUTH EVERY DAY IN THE EVENING 90 tablet 3    RYTARY  23.75-95 mg CpSR TAKE 3 CAPSULES BY MOUTH 3 TIMES A DAY (Patient taking differently: Take 1 capsule by mouth 3 (three) times daily.) 126 capsule 0    simethicone (GAS-X ORAL) Take 1 tablet by mouth daily as needed.      sumatriptan (IMITREX) 50 MG tablet Take 1 tablet (50 mg total) by mouth once. May repeat dosing 2 hours later x one if no relief. for 1 dose 10 tablet 0    VASCEPA 1 gram Cap TAKE 2 CAPSULES BY MOUTH TWICE A  capsule 5    zolpidem (AMBIEN) 10 mg Tab TAKE 1 TABLET BY MOUTH EVERY DAY IN THE EVENING 30 tablet 2     Current Facility-Administered Medications on File Prior to Visit   Medication Dose Route Frequency Provider Last Rate Last Admin    triamcinolone acetonide injection 40 mg  40 mg Intra-articular  Julian Juares MD   40 mg at 04/29/24 1218       Exam:  /62 (BP Location: Left arm, Patient Position: Sitting)   Pulse 68   Resp 16   Ht 6' (1.829 m)   Wt 70.5 kg (155 lb 6.8 oz)   BMI 21.08 kg/m²     Constitutional  Well-developed, well-nourished, appears stated age   Cardiovascular  No LE edema bilaterally   Neurological    * Mental status  MOCA = not done during today's visit     - Orientation  Oriented to conversation     - Memory   Intact recent and remote     - Attention/concentration  Attentive, vigilant during exam     - Language  Intact to conversation.     - Fund of knowledge  Aware of current events     - Executive  Well-organized thoughts     - Other     * Cranial nerves       - CN II  Pupils equal, visual fields full to confrontation     - CN III, IV, VI  Extraocular movements full, normal pursuits and saccades         - CN VII  Face strong and symmetric bilaterally     - CN VIII  Hearing intact bilaterally         - CN XI  SCM and trapezius 5/5 bilaterally       * Motor  Muscle bulk normal, strength 5/5 throughout   * Sensory   Intact to light touch throughout   * Coordination  No dysmetria with finger-to-nose    * Gait  See below.   * Deep tendon reflexes  2+ and  symmetric throughout   Babinski downgoing bilaterally   * Specialized movement exam Gen: masked facies and reduced blink   Speech: hypophonic with vocal tremor   Tremor: head tremor and vocal tremor, bilateral rest tremor (R>>L) in the hands and legs, action >>> postural tremor bilaterally; chin tremor   Bradykinesia: some difficulty on the left   Tone: slight cogwheel on the RUE    Gait: walking looks good, posture slightly stooped, left arm swing reduction, pivots to turn          Medical Record Review:  Labs, imaging and prior notes reviewed independently.       Diagnoses:          1. Essential tremor        2. Parkinsonism, unspecified Parkinsonism type  NM Brain 3D Imaging Spect      3. Neuropathic pain        4. Asymptomatic HIV infection, with no history of HIV-related illness        5. Atrial fibrillation, unspecified type            Assessment:  Mr. Guerrier is a 77 yo RH man with tremor involving his bilateral hands (R>L) since his 40s. The tremor is most noticeable with action. He also has vocal tremor, head tremor and occasional chin and right foot tremor as well. His balance is poor but he does not shuffle or fall. He has a few non-motor symptoms of PD such as constipation. He does have some very subtle parkinsonian features such as rest tremor (involving L>R side) and possibly some bradykinesia vs interruptions in his hand movements (L>R). His speech is hypophonic and he does report some drooling at times. We discussed that we are very limited in oral medications to treat the action component of his tremors due to his use of eliquis and HAART and inability to tolerate propranolol. We even briefly touched on lesional therapies keeping in mind his surgical risks in the setting of Eliquis. We have agreed to the following:    Plan:  - He uses GBP for neuropathy which he states is mild. We discussed that this medication may also be reducing his action/postural tremor. Currently he is taking 600mg BID but  feels it is causing fatigue.   - I have started him on Rytary 1 capsule TID which he is tolerating well without hypotension or nausea. Unclear effectiveness.   - I have asked that he gradually increase his Rytary to 2 caps TID over the next three weeks. Should fatigue be an issue with this titration I would have him go to 1 capsule (300mg) of GBP TID.   -Unable to use Propranolol (symptomatic bradycardia), Primidone (elquis and HAART) or TPM (HAART). We can consider baclofen in the future but worried about sedation.  - Using assistive devices such as weighted utensils, bracelets and pens.   - OT completed for tremor was minimally helpful.   - We discussed DBS and FUS which may be our next best option should his tremor become more bothersome. He will consider this.   - As for the parkinsonism I am seeing on his exam, I will order a SAMIR scan given that he is on anticoagulation making syn-bx higher risk.     RTC in 4 months to see me.     Pema Molina MD  Division of Movement and Memory Disorders  [facility]  [phone]

## 2024-11-14 NOTE — PATIENT INSTRUCTIONS
Begin with the following titaration for Rytary:    Week 1: take 2 capsules at 8am, 1 capsule at 12pm and dinner (6pm)  Week 2: take 2 capsules at 8 and 12 and 1 capsule at 6pm  Week 3: take 2 capsules at 8, 12 and 6     If at any point in this titration you feel MORE fatigued - stay at your current dose and begin reducing gabapentin to 1 capsule three times/day.     Also if any any point you feel more nauseous - let me know.

## 2024-11-15 NOTE — TELEPHONE ENCOUNTER
[employer] order form placed on 's desk for signature. Message sent to Sudhakar Chahal and Leida Ruiz (pre-service team) to process prior authorization for [employer].

## 2024-11-18 ENCOUNTER — TELEPHONE (OUTPATIENT)
Dept: NEUROLOGY | Facility: CLINIC | Age: 79
End: 2024-11-18
Payer: MEDICARE

## 2024-11-18 DIAGNOSIS — G20.C PARKINSONISM, UNSPECIFIED PARKINSONISM TYPE: ICD-10-CM

## 2024-11-18 RX ORDER — LEVODOPA AND CARBIDOPA 95; 23.75 MG/1; MG/1
3 CAPSULE, EXTENDED RELEASE ORAL 3 TIMES DAILY
Qty: 126 CAPSULE | Refills: 0 | Status: SHIPPED | OUTPATIENT
Start: 2024-11-18

## 2024-11-18 NOTE — TELEPHONE ENCOUNTER
----- Message from Radha sent at 2024  3:31 PM CST -----  Contact: PATIENT  Tacho Guerrier Jr.  MRN: [id number]  : 1945  PCP: Gene Yusuf Jr.  Home Phone      [phone]  Work Phone      Not on file.  Mobile          [phone]      MESSAGE: Patient needs a refill on RYTARY 23.75-95 3 TID sent to [facility].          Phone: [phone]

## 2024-12-09 DIAGNOSIS — G20.C PARKINSONISM, UNSPECIFIED PARKINSONISM TYPE: ICD-10-CM

## 2024-12-10 RX ORDER — LEVODOPA AND CARBIDOPA 95; 23.75 MG/1; MG/1
3 CAPSULE, EXTENDED RELEASE ORAL 3 TIMES DAILY
Qty: 126 CAPSULE | Refills: 0 | Status: SHIPPED | OUTPATIENT
Start: 2024-12-10

## 2025-01-05 DIAGNOSIS — G25.0 ESSENTIAL TREMOR: Chronic | ICD-10-CM

## 2025-01-05 DIAGNOSIS — G20.C PARKINSONISM, UNSPECIFIED PARKINSONISM TYPE: ICD-10-CM

## 2025-01-05 DIAGNOSIS — M79.2 NEUROPATHIC PAIN: ICD-10-CM

## 2025-01-06 RX ORDER — LEVODOPA AND CARBIDOPA 95; 23.75 MG/1; MG/1
3 CAPSULE, EXTENDED RELEASE ORAL 3 TIMES DAILY
Qty: 126 CAPSULE | Refills: 0 | Status: SHIPPED | OUTPATIENT
Start: 2025-01-06

## 2025-01-07 RX ORDER — GABAPENTIN 300 MG/1
600 CAPSULE ORAL 3 TIMES DAILY
Qty: 540 CAPSULE | Refills: 1 | Status: SHIPPED | OUTPATIENT
Start: 2025-01-07

## 2025-02-03 DIAGNOSIS — G20.C PARKINSONISM, UNSPECIFIED PARKINSONISM TYPE: ICD-10-CM

## 2025-02-03 RX ORDER — LEVODOPA AND CARBIDOPA 95; 23.75 MG/1; MG/1
3 CAPSULE, EXTENDED RELEASE ORAL 3 TIMES DAILY
Qty: 126 CAPSULE | Refills: 0 | Status: SHIPPED | OUTPATIENT
Start: 2025-02-03 | End: 2025-02-21

## 2025-02-13 ENCOUNTER — TELEPHONE (OUTPATIENT)
Facility: CLINIC | Age: 80
End: 2025-02-13
Payer: MEDICARE

## 2025-02-13 NOTE — TELEPHONE ENCOUNTER
Renee ([phone]) (patient's daughter) desires to know if patient is a candidate for procedure who would perform procedure and what would the out of pocket cost be for the procedure. Renee states was already notified of the cost for testing to determine if patient would be a candidate. Notified Renee message routed to 's [facility] staff as procedures done at [facility]. Renee verbalized understanding.

## 2025-02-20 ENCOUNTER — TELEPHONE (OUTPATIENT)
Facility: CLINIC | Age: 80
End: 2025-02-20
Payer: MEDICARE

## 2025-02-20 NOTE — TELEPHONE ENCOUNTER
----- Message from Silvio sent at 2/20/2025 12:49 PM CST -----  Regarding: Callback  Contact: Renee/daughter [phone]  Patient's daughter Renee calling requesting a callback from Elle in the office in regards to missed call Please call back as soon as possible.

## 2025-02-21 DIAGNOSIS — G20.C PARKINSONISM, UNSPECIFIED PARKINSONISM TYPE: ICD-10-CM

## 2025-02-21 RX ORDER — LEVODOPA AND CARBIDOPA 95; 23.75 MG/1; MG/1
3 CAPSULE, EXTENDED RELEASE ORAL 3 TIMES DAILY
Qty: 126 CAPSULE | Refills: 0 | Status: SHIPPED | OUTPATIENT
Start: 2025-02-21

## 2025-03-19 ENCOUNTER — OFFICE VISIT (OUTPATIENT)
Dept: NEUROLOGY | Facility: CLINIC | Age: 80
End: 2025-03-19
Payer: MEDICARE

## 2025-03-19 VITALS
BODY MASS INDEX: 22.13 KG/M2 | OXYGEN SATURATION: 94 % | RESPIRATION RATE: 14 BRPM | SYSTOLIC BLOOD PRESSURE: 108 MMHG | WEIGHT: 163.38 LBS | HEART RATE: 78 BPM | DIASTOLIC BLOOD PRESSURE: 66 MMHG | HEIGHT: 72 IN

## 2025-03-19 DIAGNOSIS — G20.C PARKINSONISM, UNSPECIFIED PARKINSONISM TYPE: ICD-10-CM

## 2025-03-19 DIAGNOSIS — G25.0 ESSENTIAL TREMOR: Primary | ICD-10-CM

## 2025-03-19 PROCEDURE — 99999 PR PBB SHADOW E&M-EST. PATIENT-LVL V: CPT | Mod: PBBFAC,,, | Performed by: STUDENT IN AN ORGANIZED HEALTH CARE EDUCATION/TRAINING PROGRAM

## 2025-03-19 PROCEDURE — 3074F SYST BP LT 130 MM HG: CPT | Mod: CPTII,S$GLB,, | Performed by: STUDENT IN AN ORGANIZED HEALTH CARE EDUCATION/TRAINING PROGRAM

## 2025-03-19 PROCEDURE — G2211 COMPLEX E/M VISIT ADD ON: HCPCS | Mod: S$GLB,,, | Performed by: STUDENT IN AN ORGANIZED HEALTH CARE EDUCATION/TRAINING PROGRAM

## 2025-03-19 PROCEDURE — 99214 OFFICE O/P EST MOD 30 MIN: CPT | Mod: S$GLB,,, | Performed by: STUDENT IN AN ORGANIZED HEALTH CARE EDUCATION/TRAINING PROGRAM

## 2025-03-19 PROCEDURE — 3078F DIAST BP <80 MM HG: CPT | Mod: CPTII,S$GLB,, | Performed by: STUDENT IN AN ORGANIZED HEALTH CARE EDUCATION/TRAINING PROGRAM

## 2025-03-19 PROCEDURE — 1159F MED LIST DOCD IN RCRD: CPT | Mod: CPTII,S$GLB,, | Performed by: STUDENT IN AN ORGANIZED HEALTH CARE EDUCATION/TRAINING PROGRAM

## 2025-03-19 PROCEDURE — 1125F AMNT PAIN NOTED PAIN PRSNT: CPT | Mod: CPTII,S$GLB,, | Performed by: STUDENT IN AN ORGANIZED HEALTH CARE EDUCATION/TRAINING PROGRAM

## 2025-03-19 NOTE — PROGRESS NOTES
Name: Tacho Guerrier Jr.  MRN: [id number]   [facility]: [id number]      Date: 03/19/2025      Chief Complaint / Interval History: Tremor    Tacho Guerrier Jr. is a 77 y.o. male with many years of benign essential tremor. He has a history of HIV, diagnosed in the 1990's. He also has depression, HTN, HLD, insomnia, and A-fib and history of SVT.     History of Present Illness (HPI):    Mr. Guerrier is a 76 yo RH man who presents for evaluation of tremor. His tremors began around age 44 involving his bilateral hands (R>L). He also more recently has occasional right leg and chin tremor. He has voice tremor which is not noticeable to him. His balance is not great but he is not falling. He does not consume caffeine. His tremor does respond to ETOH however he no longer drinks. His grandfather and aunt also had tremors. His handwriting is sloppy. He has not found an oral medication that helps reduce his tremor. He has been unable to tolerate propranolol and gabapentin even at relatively high doses has not helped much.     Interval History:  Mr. Guerrier presents for follow-up.   Had to stop the Rytary due to blood pressure fluctuations. SBP went down to 98. It was helping some but BP was down trending.   No issue with walking/falling.   No acting out dreams.   No hallucinations.   Still considering lesional therapies. His action/postural tremor has been the most bothersome.     Current Mvmt Medications:  GBP 600mg TID     Prior Mvmt Medication Trials:  Propranolol - caused symptomatic bradycardia   Primidone - can't use in the setting of eliquis and HAART therapy  TPM - would induce his HAART therapy and make less effective  Sinemet 25/100 CR 1 tab BID - caused dizziness but slightly effective  Sinemet 25/100 IR 1/2 tab TID - caused dizziness but slightly effective   Rytary (8/12/bedtime) at 2 caps TID caused dizziness    Nonmotor ROS:  Smell/Taste: no issues  Voice/Swallowing: no changes in voice or swallowing   - sometimes drooling    Gait/Falls: no issues  Exercise: yes, walks regularly   Dizziness: none   Hydration: does well with this   Urinary Issues: none  Constipation: occasional - using fiber gummies - had to go the hospital for impaction but was on opiates  - having at least 3 per week  Sleep/RBD: sleeps for 3-4 hours at a time, no active dreams   Hallucinations/Peripheral Illusions: no issues  Memory/Cognition/Language: doing ok   Mood: a bit worse     Past Medical History: The patient  has a past medical history of A-fib, Atrial flutter, Heart murmur, HIV (human immunodeficiency virus infection), Other and unspecified hyperlipidemia, SVT (supraventricular tachycardia), and Tachycardia. HTN    Relevant Surgical History:   Past Surgical History:   Procedure Laterality Date    COLONOSCOPY  2013    TVA    COLONOSCOPY N/A 5/5/2017    Procedure: COLONOSCOPY;  Surgeon: Mode Rae MD;  Location: [facility];  Service: Endoscopy;  Laterality: N/A;    COLONOSCOPY N/A 8/18/2023    Procedure: COLONOSCOPY;  Surgeon: Carlene Cm MD;  Location: [facility];  Service: Endoscopy;  Laterality: N/A;    PROSTATE SURGERY      TONSILLECTOMY      UPPER GASTROINTESTINAL ENDOSCOPY      in the 70s per pt        Social History: The patient  reports that he has never smoked. He has never been exposed to tobacco smoke. He has never used smokeless tobacco. He reports that he does not drink alcohol and does not use drugs.    Family History: Their family history includes Breast cancer in his mother; Cancer in his father; Cirrhosis in his mother; Diabetes in his mother and sister; Heart disease in his mother; Hypertension in his mother; Hypotension in his father; Lung cancer in his father. Family history of tremor     Allergies: Oxybutynin; Sulfa (sulfonamide antibiotics); and Latex, natural rubber     Meds:   Current Outpatient Medications on File Prior to Visit   Medication Sig Dispense Refill    acetaminophen (TYLENOL) 650 MG TbSR Take 650 mg by  mouth as needed.      ascorbic acid, vitamin C, (VITAMIN C) 500 MG tablet Take 500 mg by mouth once daily.      aspirin 81 MG Chew Take 81 mg by mouth once daily.      benazepriL (LOTENSIN) 10 MG tablet TAKE 1 TABLET BY MOUTH EVERY DAY IN THE EVENING 90 tablet 3    srbnplful-aqrhgjbw-wrungbt ala (BIKTARVY) -25 mg (25 kg or greater) Take 1 tablet by mouth once daily. 90 tablet 3    buPROPion (WELLBUTRIN XL) 150 MG TB24 tablet Take 1 tablet (150 mg total) by mouth once daily. 30 tablet 11    cholecalciferol, vitamin D3, (VITAMIN D3) 50 mcg (2,000 unit) Tab Take by mouth once daily.      diclofenac sodium (VOLTAREN ARTHRITIS PAIN) 1 % Gel Apply 2 g topically once daily. 200 g 1    diltiaZEM (CARDIZEM SR) 60 MG Cp12 TAKE 1 CAPSULE BY MOUTH TWICE A  capsule 3    diphenhydrAMINE (BENADRYL) 12.5 mg/5 mL elixir Take 6.25 mg by mouth 4 (four) times daily as needed for Allergies.      docusate sodium (COLACE ORAL) Take 1 tablet by mouth 2 (two) times daily as needed.      ELIQUIS 2.5 mg Tab TAKE 1 TABLET BY MOUTH TWICE A DAY 60 tablet 5    FOLIC ACID/MULTIVITS-MIN (MEN'S DAILY FORMULA ORAL) Take 1 tablet by mouth once daily.      gabapentin (NEURONTIN) 300 MG capsule TAKE 2 CAPSULES BY MOUTH 3 TIMES A  capsule 1    hydrOXYzine pamoate (VISTARIL) 25 MG Cap Take 1 capsule (25 mg total) by mouth every 8 (eight) hours as needed (anxiety). 90 capsule 3    mirabegron (MYRBETRIQ) 50 mg Tb24 Take 1 tablet (50 mg total) by mouth once daily. 90 tablet 3    mupirocin (BACTROBAN) 2 % ointment Apply topically 2 (two) times daily. 30 g 0    pantoprazole (PROTONIX) 40 MG tablet TAKE 1 TABLET BY MOUTH EVERY DAY 90 tablet 3    rosuvastatin (CRESTOR) 40 MG Tab TAKE 1 TABLET BY MOUTH EVERY DAY IN THE EVENING 90 tablet 3    RYTARY 23.75-95 mg CpSR TAKE 3 CAPSULES BY MOUTH 3 TIMES A  capsule 0    simethicone (GAS-X ORAL) Take 1 tablet by mouth daily as needed.      VASCEPA 1 gram Cap TAKE 2 CAPSULES BY MOUTH TWICE A DAY  120 capsule 5    zolpidem (AMBIEN) 10 mg Tab TAKE 1 TABLET BY MOUTH EVERY DAY IN THE EVENING 30 tablet 2    [DISCONTINUED] darunavir (PREZISTA) 800 mg Tab TAKE 1 TABLET BY MOUTH EVERY DAY WITH BREAKFAST 30 tablet 11    [DISCONTINUED] fluticasone propionate (FLONASE) 50 mcg/actuation nasal spray 1 spray (50 mcg total) by Each Nostril route once daily. 9.9 mL 0    [DISCONTINUED] HYDROcodone-acetaminophen (NORCO) 7.5-325 mg per tablet Take 1 tablet by mouth every 12 (twelve) hours as needed for Pain. 45 tablet 0    [DISCONTINUED] meloxicam (MOBIC) 7.5 MG tablet Take 1 tablet (7.5 mg total) by mouth once daily. 90 tablet 0    [DISCONTINUED] naproxen (NAPROSYN) 500 MG tablet Take 0.5 tablets (250 mg total) by mouth 2 (two) times daily as needed. 30 tablet 0    [DISCONTINUED] ondansetron (ZOFRAN-ODT) 4 MG TbDL Take 1 tablet (4 mg total) by mouth every 6 (six) hours as needed (nausea). 30 tablet 0    [DISCONTINUED] ritonavir (NORVIR) 100 mg Tab tablet Take 1 tablet (100 mg total) by mouth once daily. 90 tablet 3    [DISCONTINUED] sumatriptan (IMITREX) 50 MG tablet Take 1 tablet (50 mg total) by mouth once. May repeat dosing 2 hours later x one if no relief. for 1 dose 10 tablet 0     Current Facility-Administered Medications on File Prior to Visit   Medication Dose Route Frequency Provider Last Rate Last Admin    triamcinolone acetonide injection 40 mg  40 mg Intra-articular  Julian Juares MD   40 mg at 04/29/24 1218       Exam:  /66 (BP Location: Left arm, Patient Position: Sitting)   Pulse 78   Resp 14   Ht 6' (1.829 m)   Wt 74.1 kg (163 lb 5.8 oz)   SpO2 (!) 94%   BMI 22.16 kg/m²     Constitutional  Well-developed, well-nourished, appears stated age   Cardiovascular  No LE edema bilaterally   Neurological    * Mental status  MOCA = not done during today's visit     - Orientation  Oriented to conversation     - Memory   Intact recent and remote     - Attention/concentration  Attentive, vigilant during exam      - Language  Intact to conversation.     - Fund of knowledge  Aware of current events     - Executive  Well-organized thoughts     - Other     * Cranial nerves       - CN II  Pupils equal, visual fields full to confrontation     - CN III, IV, VI  Extraocular movements full, normal pursuits and saccades         - CN VII  Face strong and symmetric bilaterally     - CN VIII  Hearing intact bilaterally         - CN XI  SCM and trapezius 5/5 bilaterally       * Motor  Muscle bulk normal, strength 5/5 throughout   * Sensory   Intact to light touch throughout   * Coordination  No dysmetria with finger-to-nose    * Gait  See below.   * Deep tendon reflexes  2+ and symmetric throughout   Babinski downgoing bilaterally   * Specialized movement exam Gen: masked facies and reduced blink   Speech: hypophonic with vocal tremor   Tremor: head tremor and vocal tremor, bilateral rest tremor (R>>L) in the hands and legs, action >>> postural tremor bilaterally; chin tremor   Bradykinesia: some difficulty on the left   Tone: slight cogwheel on the RUE    Gait: walking looks good, posture slightly stooped, left arm swing reduction, pivots to turn          Medical Record Review:  Labs, imaging and prior notes reviewed independently.       Diagnoses:          1. Essential tremor        2. Parkinsonism, unspecified Parkinsonism type              Assessment:  Mr. Guerrier is a 78 yo RH man with tremor involving his bilateral hands (R>L) since his 40s. The tremor is most noticeable with action. He also has vocal tremor, head tremor and occasional chin and right foot tremor as well. His balance is poor but he does not shuffle or fall. He has a few non-motor symptoms of PD such as constipation. He does have some very subtle parkinsonian features such as rest tremor (involving L>R side) and possibly some bradykinesia vs interruptions in his hand movements (L>R). His speech is hypophonic and he does report some drooling at times. We discussed  that we are very limited in oral medications to treat the action component of his tremors due to his use of eliquis and HAART and inability to tolerate propranolol. We even briefly touched on lesional therapies keeping in mind his surgical risks in the setting of Eliquis. We have agreed to the following:    Plan:  - He uses GBP for neuropathy which he states is mild. We discussed that this medication may also be reducing his action/postural tremor. Currently he is taking 600mg BID but feels it is causing fatigue.   - He was unable to tolerate the increase in Rytary to 2 caps TID due to hypotension. I have asked him to begin 1 capsule TID for now and discuss with his PCP options to reduce or discontinue anti-hypertensives so we have more room to treat his parkinsonism. May consider ND labs given orthostasis.   -Unable to use Propranolol (symptomatic bradycardia), Primidone (elquis and HAART) or TPM (HAART). We can consider baclofen in the future but worried about sedation.  - Using assistive devices such as weighted utensils, bracelets and pens.   - OT completed for tremor was minimally helpful.   - We discussed DBS and FUS which may be our next best option should his tremor become more bothersome. He will consider this.   - As for the parkinsonism I am seeing on his exam, I will order a SAMIR scan given that he is on anticoagulation making syn-bx higher risk.     RTC in 4 months to see me.     Pema Molina MD  Division of Movement and Memory Disorders  [facility]  [phone]

## 2025-03-19 NOTE — PATIENT INSTRUCTIONS
Try going to 95mg - 1 capsule three times/day of the Rytary to see if this drops the blood pressure.

## 2025-04-09 DIAGNOSIS — G20.C PARKINSONISM, UNSPECIFIED PARKINSONISM TYPE: ICD-10-CM

## 2025-04-09 RX ORDER — LEVODOPA AND CARBIDOPA 95; 23.75 MG/1; MG/1
3 CAPSULE, EXTENDED RELEASE ORAL 3 TIMES DAILY
Qty: 126 CAPSULE | Refills: 0 | Status: SHIPPED | OUTPATIENT
Start: 2025-04-09

## 2025-04-09 NOTE — TELEPHONE ENCOUNTER
----- Message from Radha sent at 4/8/2025  3:26 PM CDT -----  Contact: PATIENT  Tacho IRISH Guerrier Jr.MRN: [id number]: 1945PCP: Gene Yusuf Jr.Home Phone      [phone] Phone      Not on file.Mobile          [phone]: Patient is needing a refill on RYTARY 23.75-95 mg. 3 TID sent to [facility].  He also states that he has not been contacted by anyone to schedule the MRI that Dr. Molina is wanting him to have.Phone: [phone]  ----- Message -----  From: Radha Arora  Sent: 4/8/2025   3:27 PM CDT  To: Jesse Mcadams Staff    Tacho Dillardjean .MRN: [id number]: 1945PCP: Gene Yusuf Jr.Home Phone      [phone] Phone      Not on file.Mobile          [phone]: Patient is needing a refill on RYTARY 23.75-95 mg. 3 TID sent to [facility].Phone: [phone]

## 2025-04-15 ENCOUNTER — TELEPHONE (OUTPATIENT)
Dept: NEUROLOGY | Facility: CLINIC | Age: 80
End: 2025-04-15
Payer: MEDICARE

## 2025-04-28 DIAGNOSIS — G20.C PARKINSONISM, UNSPECIFIED PARKINSONISM TYPE: ICD-10-CM

## 2025-04-28 RX ORDER — LEVODOPA AND CARBIDOPA 95; 23.75 MG/1; MG/1
3 CAPSULE, EXTENDED RELEASE ORAL 3 TIMES DAILY
Qty: 126 CAPSULE | Refills: 0 | Status: SHIPPED | OUTPATIENT
Start: 2025-04-28

## 2025-05-06 ENCOUNTER — TELEPHONE (OUTPATIENT)
Facility: CLINIC | Age: 80
End: 2025-05-06
Payer: MEDICARE

## 2025-05-06 NOTE — TELEPHONE ENCOUNTER
Called and spoke with  Chey, he informed me that he spoke with Dr Molina and she will have a referral  faxed over to the Imaging center for him so he can have an appointment scheduled.  Once completed I will fax over the orders.

## 2025-05-06 NOTE — TELEPHONE ENCOUNTER
----- Message from Jennifer sent at 5/5/2025  4:54 PM CDT -----  Contact: Patient  Tacho Guerrier Jr.MRN: [id number]: 1945PCP: Gene Yusuf Jr.Home Phone      [phone] Phone      Not on file.Mobile          [phone]: Patient states the Imaging center in [facility] is needing a referral before he can get the imaging of his head in regards to his tremors.PHONE: [phone]

## 2025-05-19 DIAGNOSIS — G20.C PARKINSONISM, UNSPECIFIED PARKINSONISM TYPE: ICD-10-CM

## 2025-05-19 RX ORDER — LEVODOPA AND CARBIDOPA 95; 23.75 MG/1; MG/1
3 CAPSULE, EXTENDED RELEASE ORAL 3 TIMES DAILY
Qty: 126 CAPSULE | Refills: 0 | Status: SHIPPED | OUTPATIENT
Start: 2025-05-19

## 2025-06-10 DIAGNOSIS — G20.C PARKINSONISM, UNSPECIFIED PARKINSONISM TYPE: ICD-10-CM

## 2025-06-10 RX ORDER — LEVODOPA AND CARBIDOPA 95; 23.75 MG/1; MG/1
3 CAPSULE, EXTENDED RELEASE ORAL 3 TIMES DAILY
Qty: 126 CAPSULE | Refills: 0 | Status: SHIPPED | OUTPATIENT
Start: 2025-06-10

## 2025-06-16 ENCOUNTER — TELEPHONE (OUTPATIENT)
Facility: CLINIC | Age: 80
End: 2025-06-16
Payer: MEDICARE

## 2025-06-16 NOTE — TELEPHONE ENCOUNTER
----- Message from Med Assistant Gale sent at 2025  2:17 PM CDT -----  Regarding: Referral  Contact: Tacho  MRN: [id number]  : 1945  PCP: Gene Yusuf MD  Home Phone:  Work Phone:  Mobile: [phone]    Message: Patient states that he is needing a new referral placed for him to be seen at the Imaging center in [place]. Please advise.    Phone: [phone]

## 2025-07-15 DIAGNOSIS — G20.C PARKINSONISM, UNSPECIFIED PARKINSONISM TYPE: ICD-10-CM

## 2025-07-16 RX ORDER — LEVODOPA AND CARBIDOPA 95; 23.75 MG/1; MG/1
3 CAPSULE, EXTENDED RELEASE ORAL 3 TIMES DAILY
Qty: 126 CAPSULE | Refills: 0 | Status: SHIPPED | OUTPATIENT
Start: 2025-07-16

## 2025-07-23 ENCOUNTER — OFFICE VISIT (OUTPATIENT)
Dept: NEUROLOGY | Facility: CLINIC | Age: 80
End: 2025-07-23
Payer: MEDICARE

## 2025-07-23 VITALS
OXYGEN SATURATION: 94 % | HEIGHT: 72 IN | BODY MASS INDEX: 23.95 KG/M2 | RESPIRATION RATE: 18 BRPM | WEIGHT: 176.81 LBS | SYSTOLIC BLOOD PRESSURE: 118 MMHG | HEART RATE: 70 BPM | DIASTOLIC BLOOD PRESSURE: 66 MMHG

## 2025-07-23 DIAGNOSIS — Z21 ASYMPTOMATIC HIV INFECTION, WITH NO HISTORY OF HIV-RELATED ILLNESS: ICD-10-CM

## 2025-07-23 DIAGNOSIS — G25.0 ESSENTIAL TREMOR: Primary | ICD-10-CM

## 2025-07-23 PROCEDURE — 1101F PT FALLS ASSESS-DOCD LE1/YR: CPT | Mod: CPTII,S$GLB,, | Performed by: STUDENT IN AN ORGANIZED HEALTH CARE EDUCATION/TRAINING PROGRAM

## 2025-07-23 PROCEDURE — 3074F SYST BP LT 130 MM HG: CPT | Mod: CPTII,S$GLB,, | Performed by: STUDENT IN AN ORGANIZED HEALTH CARE EDUCATION/TRAINING PROGRAM

## 2025-07-23 PROCEDURE — G2211 COMPLEX E/M VISIT ADD ON: HCPCS | Mod: S$GLB,,, | Performed by: STUDENT IN AN ORGANIZED HEALTH CARE EDUCATION/TRAINING PROGRAM

## 2025-07-23 PROCEDURE — 1159F MED LIST DOCD IN RCRD: CPT | Mod: CPTII,S$GLB,, | Performed by: STUDENT IN AN ORGANIZED HEALTH CARE EDUCATION/TRAINING PROGRAM

## 2025-07-23 PROCEDURE — 3078F DIAST BP <80 MM HG: CPT | Mod: CPTII,S$GLB,, | Performed by: STUDENT IN AN ORGANIZED HEALTH CARE EDUCATION/TRAINING PROGRAM

## 2025-07-23 PROCEDURE — 99214 OFFICE O/P EST MOD 30 MIN: CPT | Mod: S$GLB,,, | Performed by: STUDENT IN AN ORGANIZED HEALTH CARE EDUCATION/TRAINING PROGRAM

## 2025-07-23 PROCEDURE — 99999 PR PBB SHADOW E&M-EST. PATIENT-LVL V: CPT | Mod: PBBFAC,,, | Performed by: STUDENT IN AN ORGANIZED HEALTH CARE EDUCATION/TRAINING PROGRAM

## 2025-07-23 PROCEDURE — 1125F AMNT PAIN NOTED PAIN PRSNT: CPT | Mod: CPTII,S$GLB,, | Performed by: STUDENT IN AN ORGANIZED HEALTH CARE EDUCATION/TRAINING PROGRAM

## 2025-07-23 PROCEDURE — 3288F FALL RISK ASSESSMENT DOCD: CPT | Mod: CPTII,S$GLB,, | Performed by: STUDENT IN AN ORGANIZED HEALTH CARE EDUCATION/TRAINING PROGRAM

## 2025-07-23 NOTE — PROGRESS NOTES
Name: Tacho Guerrier Jr.  MRN: [id number]   CSN: [id number]      Date: 07/23/2025      Chief Complaint / Interval History: Tremor    Tacho Guerrier Jr. is a 77 y.o. male with many years of benign essential tremor. He has a history of HIV, diagnosed in the 1990's. He also has depression, HTN, HLD, insomnia, and A-fib and history of SVT.     History of Present Illness (HPI):    Mr. Guerrier is a 78 yo RH man who presents for evaluation of tremor. His tremors began around age 44 involving his bilateral hands (R>L). He also more recently has occasional right leg and chin tremor. He has voice tremor which is not noticeable to him. His balance is not great but he is not falling. He does not consume caffeine. His tremor does respond to ETOH however he no longer drinks. His grandfather and aunt also had tremors. His handwriting is sloppy. He has not found an oral medication that helps reduce his tremor. He has been unable to tolerate propranolol and gabapentin even at relatively high doses has not helped much.     Interval History:  Mr. Guerrier presents for follow-up.   SAMIR scan was normal. Still on Rytary but only 1 capsule TID.   No falling, no walking issues.   No acting out of dreams. No hallucinations.   BP  has been stable.     Current Mvmt Medications:  GBP 600mg BID   Rytary 1 cap 95mg TID    Prior Mvmt Medication Trials:  Propranolol - caused symptomatic bradycardia   Primidone - can't use in the setting of eliquis and HAART therapy  TPM - would induce his HAART therapy and make less effective  Sinemet 25/100 CR 1 tab BID - caused dizziness but slightly effective  Sinemet 25/100 IR 1/2 tab TID - caused dizziness but slightly effective   Rytary (8/12/bedtime) at 2 caps TID caused dizziness    Nonmotor ROS:  Smell/Taste: no issues  Voice/Swallowing: no changes in voice or swallowing   - sometimes drooling   Gait/Falls: no issues  Exercise: yes, walks regularly   Dizziness: none   Hydration: does well with this   Urinary  Issues: none  Constipation: occasional - using fiber gummies - had to go the hospital for impaction but was on opiates  - having at least 3 per week  Sleep/RBD: sleeps for 3-4 hours at a time, no active dreams   Hallucinations/Peripheral Illusions: no issues  Memory/Cognition/Language: doing ok   Mood: a bit worse     Past Medical History: The patient  has a past medical history of A-fib, Atrial flutter, Heart murmur, HIV (human immunodeficiency virus infection), Other and unspecified hyperlipidemia, SVT (supraventricular tachycardia), and Tachycardia. HTN    Relevant Surgical History:   Past Surgical History:   Procedure Laterality Date    COLONOSCOPY  2013    TVA    COLONOSCOPY N/A 5/5/2017    Procedure: COLONOSCOPY;  Surgeon: Mode Rae MD;  Location: [facility];  Service: Endoscopy;  Laterality: N/A;    COLONOSCOPY N/A 8/18/2023    Procedure: COLONOSCOPY;  Surgeon: Carlene Cm MD;  Location: [facility];  Service: Endoscopy;  Laterality: N/A;    PROSTATE SURGERY      TONSILLECTOMY      UPPER GASTROINTESTINAL ENDOSCOPY      in the 70s per pt        Social History: The patient  reports that he has never smoked. He has never been exposed to tobacco smoke. He has never used smokeless tobacco. He reports that he does not drink alcohol and does not use drugs.    Family History: Their family history includes Breast cancer in his mother; Cancer in his father; Cirrhosis in his mother; Diabetes in his mother and sister; Heart disease in his mother; Hypertension in his mother; Hypotension in his father; Lung cancer in his father. Family history of tremor     Allergies: Oxybutynin; Sulfa (sulfonamide antibiotics); Latex; Oxycodone; Sulfamethoxazole; Trimethoprim; and Latex, natural rubber     Meds:   Current Outpatient Medications on File Prior to Visit   Medication Sig Dispense Refill    acetaminophen (TYLENOL) 650 MG TbSR Take 650 mg by mouth as needed.      ascorbic acid, vitamin C, (VITAMIN C) 500 MG  tablet Take 500 mg by mouth once daily.      aspirin 81 MG Chew Take 81 mg by mouth once daily.      BIKTARVY -25 mg (25 kg or greater) TAKE 1 TABLET BY MOUTH EVERY DAY 30 tablet 5    buPROPion (WELLBUTRIN XL) 150 MG TB24 tablet Take 1 tablet (150 mg total) by mouth once daily. 30 tablet 11    cholecalciferol, vitamin D3, (VITAMIN D3) 50 mcg (2,000 unit) Tab Take by mouth once daily.      diclofenac sodium (VOLTAREN ARTHRITIS PAIN) 1 % Gel Apply 2 g topically once daily. 200 g 1    diltiaZEM (CARDIZEM SR) 60 MG Cp12 TAKE 1 CAPSULE BY MOUTH TWICE A  capsule 3    diphenhydrAMINE (BENADRYL) 12.5 mg/5 mL elixir Take 6.25 mg by mouth 4 (four) times daily as needed for Allergies.      docusate sodium (COLACE ORAL) Take 1 tablet by mouth 2 (two) times daily as needed.      ELIQUIS 2.5 mg Tab TAKE 1 TABLET BY MOUTH TWICE A DAY 60 tablet 5    FOLIC ACID/MULTIVITS-MIN (MEN'S DAILY FORMULA ORAL) Take 1 tablet by mouth once daily.      gabapentin (NEURONTIN) 300 MG capsule TAKE 2 CAPSULES BY MOUTH 3 TIMES A  capsule 1    hydrOXYzine pamoate (VISTARIL) 25 MG Cap TAKE 1 CAPSULE BY MOUTH EVERY 8 HOURS AS NEEDED FOR ANXIETY 270 capsule 1    mirabegron (MYRBETRIQ) 50 mg Tb24 Take 1 tablet (50 mg total) by mouth once daily. 90 tablet 3    mupirocin (BACTROBAN) 2 % ointment Apply topically 2 (two) times daily. 30 g 0    naproxen (NAPROSYN) 500 MG tablet TAKE 1/2 TABLET BY MOUTH TWICE A DAY AS NEEDED      pantoprazole (PROTONIX) 40 MG tablet TAKE 1 TABLET BY MOUTH EVERY DAY 90 tablet 3    rosuvastatin (CRESTOR) 40 MG Tab TAKE 1 TABLET BY MOUTH EVERY DAY IN THE EVENING 90 tablet 3    RYTARY 23.75-95 mg CpSR TAKE 3 CAPSULES BY MOUTH 3 TIMES A  capsule 0    simethicone (GAS-X ORAL) Take 1 tablet by mouth daily as needed.      VASCEPA 1 gram Cap TAKE 2 CAPSULES BY MOUTH TWICE A  capsule 5    zolpidem (AMBIEN) 10 mg Tab TAKE 1 TABLET BY MOUTH EVERY DAY IN THE EVENING 30 tablet 2     Current  Facility-Administered Medications on File Prior to Visit   Medication Dose Route Frequency Provider Last Rate Last Admin    triamcinolone acetonide injection 40 mg  40 mg Intra-articular  Julian Juares MD   40 mg at 04/29/24 1218       Exam:  /66   Pulse 70   Resp 18   Ht 6' (1.829 m)   Wt 80.2 kg (176 lb 12.9 oz)   SpO2 (!) 94%   BMI 23.98 kg/m²     Constitutional  Well-developed, well-nourished, appears stated age   Cardiovascular  No LE edema bilaterally   Neurological    * Mental status  MOCA = not done during today's visit     - Orientation  Oriented to conversation     - Memory   Intact recent and remote     - Attention/concentration  Attentive, vigilant during exam     - Language  Intact to conversation.     - Fund of knowledge  Aware of current events     - Executive  Well-organized thoughts     - Other     * Cranial nerves       - CN II  Pupils equal, visual fields full to confrontation     - CN III, IV, VI  Extraocular movements full, normal pursuits and saccades         - CN VII  Face strong and symmetric bilaterally     - CN VIII  Hearing intact bilaterally         - CN XI  SCM and trapezius 5/5 bilaterally       * Motor  Muscle bulk normal, strength 5/5 throughout   * Sensory   Intact to light touch throughout   * Coordination  No dysmetria with finger-to-nose    * Gait  See below.   * Deep tendon reflexes  2+ and symmetric throughout   Babinski downgoing bilaterally   * Specialized movement exam Gen: masked facies and reduced blink   Speech: hypophonic with vocal tremor   Tremor: head tremor and vocal tremor, bilateral rest tremor (R>>L) in the hands and legs, action >>> postural tremor bilaterally; chin tremor   Bradykinesia: some difficulty on the left   Tone: slight cogwheel on the RUE    Gait: walking looks good, posture slightly stooped, left arm swing reduction, pivots to turn          Medical Record Review:  Labs, imaging and prior notes reviewed independently.        Diagnoses:          1. Essential tremor        2. Asymptomatic HIV infection, with no history of HIV-related illness                Assessment:  Mr. Guerrier is a 80 yo RH man with tremor involving his bilateral hands (R>L) since his 40s. The tremor is most noticeable with action. He also has vocal tremor, head tremor and occasional chin and right foot tremor as well. His balance is poor but he does not shuffle or fall. He has a few non-motor symptoms of PD such as constipation. He does have some very subtle parkinsonian features such as rest tremor (involving L>R side) and possibly some bradykinesia vs interruptions in his hand movements (L>R). His speech is hypophonic and he does report some drooling at times. SAMIR was NEGATIVE.  We discussed that we are very limited in oral medications to treat the action component of his tremors due to his use of eliquis and HAART and inability to tolerate propranolol. We even briefly touched on lesional therapies keeping in mind his surgical risks in the setting of Eliquis. We have agreed to the following:    Plan:  - He uses GBP for neuropathy which he states is mild. We discussed that this medication may also be reducing his action/postural tremor. Currently he is taking 600mg BID - cannot tolerate higher doses.   - He was unable to tolerate the increase in Rytary to 2 caps TID due to hypotension. I have asked him to begin 1 capsule TID for now. SAMIR negative.  May consider ND labs given orthostasis.   -Unable to use Propranolol (symptomatic bradycardia), Primidone (elquis and HAART) or TPM (HAART). We can consider baclofen in the future but worried about sedation.  - Using assistive devices such as weighted utensils, bracelets and pens.   - OT completed for tremor was minimally helpful.   - We discussed DBS and FUS which may be our next best option should his tremor become more bothersome. He will consider this following his left shoulder procedure.     RTC in 4 months      Pema Molina MD  Division of Movement and Memory Disorders  [facility]  [phone]

## 2025-08-14 DIAGNOSIS — G25.0 ESSENTIAL TREMOR: Chronic | ICD-10-CM

## 2025-08-14 DIAGNOSIS — M79.2 NEUROPATHIC PAIN: ICD-10-CM

## 2025-08-14 RX ORDER — GABAPENTIN 300 MG/1
600 CAPSULE ORAL 3 TIMES DAILY
Qty: 540 CAPSULE | Refills: 1 | Status: SHIPPED | OUTPATIENT
Start: 2025-08-14

## 2025-09-03 DIAGNOSIS — G20.C PARKINSONISM, UNSPECIFIED PARKINSONISM TYPE: ICD-10-CM

## 2025-09-04 RX ORDER — LEVODOPA AND CARBIDOPA 95; 23.75 MG/1; MG/1
3 CAPSULE, EXTENDED RELEASE ORAL 3 TIMES DAILY
Qty: 126 CAPSULE | Refills: 0 | Status: SHIPPED | OUTPATIENT
Start: 2025-09-04